# Patient Record
Sex: FEMALE | Race: WHITE | NOT HISPANIC OR LATINO | Employment: UNEMPLOYED | URBAN - METROPOLITAN AREA
[De-identification: names, ages, dates, MRNs, and addresses within clinical notes are randomized per-mention and may not be internally consistent; named-entity substitution may affect disease eponyms.]

---

## 2018-03-15 ENCOUNTER — OFFICE VISIT (OUTPATIENT)
Dept: NEUROLOGY | Facility: CLINIC | Age: 43
End: 2018-03-15

## 2018-03-15 VITALS
SYSTOLIC BLOOD PRESSURE: 108 MMHG | HEART RATE: 88 BPM | DIASTOLIC BLOOD PRESSURE: 80 MMHG | WEIGHT: 163 LBS | OXYGEN SATURATION: 99 %

## 2018-03-15 DIAGNOSIS — G56.03 BILATERAL CARPAL TUNNEL SYNDROME: ICD-10-CM

## 2018-03-15 DIAGNOSIS — M79.18 CERVICAL MYOFASCIAL PAIN SYNDROME: ICD-10-CM

## 2018-03-15 DIAGNOSIS — M54.40 CHRONIC LOW BACK PAIN WITH SCIATICA, SCIATICA LATERALITY UNSPECIFIED, UNSPECIFIED BACK PAIN LATERALITY: ICD-10-CM

## 2018-03-15 DIAGNOSIS — G89.29 CHRONIC LOW BACK PAIN WITH SCIATICA, SCIATICA LATERALITY UNSPECIFIED, UNSPECIFIED BACK PAIN LATERALITY: ICD-10-CM

## 2018-03-15 DIAGNOSIS — M79.7 FIBROMYALGIA: Primary | ICD-10-CM

## 2018-03-15 PROCEDURE — 99244 OFF/OP CNSLTJ NEW/EST MOD 40: CPT | Performed by: PSYCHIATRY & NEUROLOGY

## 2018-03-15 RX ORDER — PANTOPRAZOLE SODIUM 20 MG/1
TABLET, DELAYED RELEASE ORAL
COMMUNITY
Start: 2018-03-12

## 2018-03-15 RX ORDER — GABAPENTIN 400 MG/1
CAPSULE ORAL
COMMUNITY
Start: 2018-01-24

## 2018-03-15 RX ORDER — METHOCARBAMOL 500 MG/1
TABLET, FILM COATED ORAL
COMMUNITY
Start: 2018-03-12

## 2018-03-15 RX ORDER — MIRABEGRON 50 MG/1
TABLET, FILM COATED, EXTENDED RELEASE ORAL
COMMUNITY
Start: 2018-03-05

## 2018-03-15 RX ORDER — LEVOTHYROXINE SODIUM 75 MCG
TABLET ORAL
COMMUNITY
Start: 2018-03-12

## 2018-03-15 RX ORDER — DICYCLOMINE HYDROCHLORIDE 10 MG/1
CAPSULE ORAL
COMMUNITY
Start: 2018-03-05

## 2018-03-15 RX ORDER — SUCRALFATE 1 G/1
TABLET ORAL
COMMUNITY
Start: 2018-03-12 | End: 2018-10-02

## 2018-03-15 RX ORDER — CELECOXIB 200 MG/1
CAPSULE ORAL
COMMUNITY
Start: 2018-03-12

## 2018-03-15 RX ORDER — HYDROCODONE BITARTRATE AND ACETAMINOPHEN 7.5; 325 MG/1; MG/1
1 TABLET ORAL EVERY 6 HOURS PRN
COMMUNITY

## 2018-03-15 RX ORDER — AMITRIPTYLINE HYDROCHLORIDE 50 MG/1
TABLET, FILM COATED ORAL
COMMUNITY
Start: 2018-03-12 | End: 2018-08-21
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
## 2018-05-29 ENCOUNTER — TELEPHONE (OUTPATIENT)
Dept: NEUROLOGY | Facility: CLINIC | Age: 43
End: 2018-05-29

## 2018-05-29 ENCOUNTER — HOSPITAL ENCOUNTER (OUTPATIENT)
Dept: MRI IMAGING | Facility: HOSPITAL | Age: 43
Discharge: HOME | End: 2018-05-29
Attending: PSYCHIATRY & NEUROLOGY

## 2018-05-29 ENCOUNTER — HOSPITAL ENCOUNTER (OUTPATIENT)
Dept: NEUROLOGY | Facility: HOSPITAL | Age: 43
Discharge: HOME | End: 2018-05-29
Attending: PSYCHIATRY & NEUROLOGY | Admitting: PSYCHIATRY & NEUROLOGY

## 2018-05-29 PROCEDURE — 72148 MRI LUMBAR SPINE W/O DYE: CPT

## 2018-05-29 PROCEDURE — 95886 MUSC TEST DONE W/N TEST COMP: CPT

## 2018-05-29 PROCEDURE — 95910 NRV CNDJ TEST 7-8 STUDIES: CPT

## 2018-05-31 ENCOUNTER — TELEPHONE (OUTPATIENT)
Dept: NEUROLOGY | Facility: CLINIC | Age: 43
End: 2018-05-31

## 2018-06-05 ENCOUNTER — OFFICE VISIT (OUTPATIENT)
Dept: NEUROLOGY | Facility: CLINIC | Age: 43
End: 2018-06-05

## 2018-06-05 VITALS
HEART RATE: 84 BPM | BODY MASS INDEX: 24.99 KG/M2 | WEIGHT: 150 LBS | HEIGHT: 65 IN | DIASTOLIC BLOOD PRESSURE: 81 MMHG | SYSTOLIC BLOOD PRESSURE: 112 MMHG

## 2018-06-05 DIAGNOSIS — M79.18 CERVICAL MYOFASCIAL PAIN SYNDROME: ICD-10-CM

## 2018-06-05 DIAGNOSIS — M54.40 CHRONIC LOW BACK PAIN WITH SCIATICA, SCIATICA LATERALITY UNSPECIFIED, UNSPECIFIED BACK PAIN LATERALITY: ICD-10-CM

## 2018-06-05 DIAGNOSIS — G89.29 CHRONIC LOW BACK PAIN WITH SCIATICA, SCIATICA LATERALITY UNSPECIFIED, UNSPECIFIED BACK PAIN LATERALITY: ICD-10-CM

## 2018-06-05 DIAGNOSIS — G56.03 BILATERAL CARPAL TUNNEL SYNDROME: ICD-10-CM

## 2018-06-05 DIAGNOSIS — M79.7 FIBROMYALGIA: Primary | ICD-10-CM

## 2018-06-05 PROCEDURE — 99213 OFFICE O/P EST LOW 20 MIN: CPT | Performed by: PSYCHIATRY & NEUROLOGY

## 2018-06-05 NOTE — PROGRESS NOTES
Subjective:     Patient ID: rPiya Song is a 43 y.o. female.    CC:   Chief Complaint   Patient presents with   • Fibromyalgia       HPI:   History of Present Illness  The following portions of the patient's history were reviewed and updated as appropriate: allergies, current medications, past family history, past medical history, past social history, past surgical history and problem list.     Returns c/o persistent low back, neck, leg pain. MRI of ls spine and EMG/NCVs of legs were normal. Unfortunately she has tried and failed PT, chiropractic treatments, injections, multiple meds including gabapentin, Cymbalta, Lyrica, Elavil, other antidepressants, local measures including compounded creams, muscle relaxants.She was diagnosed with FMS by rheumatology around 2011. She has chronic anxiety and depression, on medication for that.    Past Medical History:   Diagnosis Date   • CTS (carpal tunnel syndrome)    • Depression    • Fibromyalgia, primary    • Migraine    • Sleep apnea    • Sleep apnea        Past Surgical History:   Procedure Laterality Date   • CHOLECYSTECTOMY     • ENDOMETRIAL ABLATION     • TONSILLECTOMY     • TUBAL ABDOMINAL LIGATION         Social History     Social History   • Marital status:      Spouse name: N/A   • Number of children: N/A   • Years of education: N/A     Occupational History   • Not on file.     Social History Main Topics   • Smoking status: Never Smoker   • Smokeless tobacco: Not on file   • Alcohol use Yes      Comment: once a week   • Drug use: Unknown   • Sexual activity: Not on file     Other Topics Concern   • Not on file     Social History Narrative   • No narrative on file       Family History   Problem Relation Age of Onset   • Hypertension Mother    • Malig Hypertension Mother    • Chorea Mother    • Stroke Paternal Grandmother    • Seizures Paternal Grandmother    • Heart disease Paternal Grandfather         Review of Systems   Constitutional: Positive for  fatigue. Negative for chills, fever and unexpected weight change.   HENT: Negative for ear pain, hearing loss, nosebleeds, rhinorrhea and sore throat.    Eyes: Negative for photophobia, pain, discharge, itching and visual disturbance.   Respiratory: Negative for cough, chest tightness, shortness of breath and wheezing.    Cardiovascular: Negative for chest pain, palpitations and leg swelling.   Gastrointestinal: Positive for abdominal pain, constipation and diarrhea. Negative for blood in stool, nausea and vomiting.   Endocrine: Positive for cold intolerance.   Genitourinary: Negative for dysuria, frequency, hematuria and urgency.   Musculoskeletal: Positive for arthralgias, back pain, joint swelling, myalgias, neck pain and neck stiffness. Negative for gait problem.   Skin: Negative for rash and wound.   Allergic/Immunologic: Negative for environmental allergies and food allergies.   Neurological: Positive for speech difficulty and headaches. Negative for dizziness, tremors, seizures, syncope, weakness, light-headedness and numbness.   Hematological: Negative for adenopathy. Does not bruise/bleed easily.   Psychiatric/Behavioral: Positive for confusion and dysphoric mood. Negative for agitation, decreased concentration, hallucinations, sleep disturbance and suicidal ideas. The patient is not nervous/anxious.         Objective:    Neurologic Exam     Mental Status   Oriented to person, place, and time.       Physical Exam   Constitutional: She is oriented to person, place, and time. She appears well-developed and well-nourished.   Cardiovascular: Normal rate and regular rhythm.    Pulmonary/Chest: Effort normal.   Neurological: She is alert and oriented to person, place, and time. She has normal reflexes.   Psychiatric: She has a normal mood and affect. Her behavior is normal. Thought content normal.       Assessment/Plan:       Priya was seen today for fibromyalgia.    Diagnoses and all orders for this  visit:    Fibromyalgia     -  Running out of treatment options, suspect that she needs a comprehensive pain management program.  Cervical myofascial pain syndrome     -  Pain management.  Chronic low back pain with sciatica, sciatica laterality unspecified, unspecified back pain laterality     -  Pain management  Bilateral carpal tunnel syndrome     -  Previously noted on nerve test, currently not her main concern.           Chuck Ghosh MD  6/13/2018

## 2018-06-19 ENCOUNTER — TELEPHONE (OUTPATIENT)
Dept: NEUROLOGY | Facility: CLINIC | Age: 43
End: 2018-06-19

## 2018-06-19 NOTE — TELEPHONE ENCOUNTER
----- Message from Chuck Ghosh MD sent at 6/13/2018  9:12 AM EDT -----  Regarding: referral  Referred to Dr. Renner to see what can be done for her from a comprehensive pain treatment approach, let her know that, thanks.

## 2018-08-14 ENCOUNTER — OFFICE VISIT (OUTPATIENT)
Dept: PSYCHIATRY | Facility: CLINIC | Age: 43
End: 2018-08-14

## 2018-08-14 DIAGNOSIS — F43.10 POST TRAUMATIC STRESS DISORDER (PTSD): ICD-10-CM

## 2018-08-14 DIAGNOSIS — F41.1 GENERALIZED ANXIETY DISORDER: Primary | ICD-10-CM

## 2018-08-14 DIAGNOSIS — F32.89 OTHER DEPRESSION: ICD-10-CM

## 2018-08-14 PROCEDURE — 90791 PSYCH DIAGNOSTIC EVALUATION: CPT | Performed by: SOCIAL WORKER
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
## 2018-08-21 ENCOUNTER — OFFICE VISIT (OUTPATIENT)
Dept: PSYCHIATRY | Facility: CLINIC | Age: 43
End: 2018-08-21

## 2018-08-21 VITALS — WEIGHT: 152 LBS | BODY MASS INDEX: 25.33 KG/M2 | HEIGHT: 65 IN

## 2018-08-21 DIAGNOSIS — F43.12 CHRONIC POST-TRAUMATIC STRESS DISORDER (PTSD): Primary | ICD-10-CM

## 2018-08-21 DIAGNOSIS — Z63.0 MARITAL CONFLICT: ICD-10-CM

## 2018-08-21 DIAGNOSIS — F33.1 MODERATE EPISODE OF RECURRENT MAJOR DEPRESSIVE DISORDER (HCC): ICD-10-CM

## 2018-08-21 DIAGNOSIS — G89.29 OTHER CHRONIC PAIN: ICD-10-CM

## 2018-08-21 DIAGNOSIS — F10.20 UNCOMPLICATED ALCOHOL DEPENDENCE (HCC): ICD-10-CM

## 2018-08-21 DIAGNOSIS — F51.05 INSOMNIA DUE TO MENTAL CONDITION: ICD-10-CM

## 2018-08-21 PROCEDURE — 99214 OFFICE O/P EST MOD 30 MIN: CPT | Performed by: NURSE PRACTITIONER

## 2018-08-21 RX ORDER — TRAZODONE HYDROCHLORIDE 50 MG/1
TABLET ORAL
Qty: 60 TABLET | Refills: 1 | Status: SHIPPED | OUTPATIENT
Start: 2018-08-21 | End: 2018-10-02

## 2018-08-21 RX ORDER — VENLAFAXINE HYDROCHLORIDE 75 MG/1
75 CAPSULE, EXTENDED RELEASE ORAL DAILY
Qty: 30 CAPSULE | Refills: 1 | Status: SHIPPED | OUTPATIENT
Start: 2018-08-21 | End: 2018-10-02 | Stop reason: SINTOL

## 2018-08-21 RX ORDER — BUSPIRONE HYDROCHLORIDE 5 MG/1
5 TABLET ORAL 3 TIMES DAILY
Qty: 90 TABLET | Refills: 1 | Status: SHIPPED | OUTPATIENT
Start: 2018-08-21 | End: 2018-10-02

## 2018-08-21 SDOH — SOCIAL STABILITY - SOCIAL INSECURITY: PROBLEMS IN RELATIONSHIP WITH SPOUSE OR PARTNER: Z63.0

## 2018-09-05 ENCOUNTER — OFFICE VISIT (OUTPATIENT)
Dept: PSYCHIATRY | Facility: CLINIC | Age: 43
End: 2018-09-05

## 2018-09-05 DIAGNOSIS — F43.12 CHRONIC POST-TRAUMATIC STRESS DISORDER (PTSD): Primary | ICD-10-CM

## 2018-09-05 DIAGNOSIS — F33.1 MODERATE EPISODE OF RECURRENT MAJOR DEPRESSIVE DISORDER (HCC): ICD-10-CM

## 2018-09-05 DIAGNOSIS — F10.20 UNCOMPLICATED ALCOHOL DEPENDENCE (HCC): ICD-10-CM

## 2018-09-05 DIAGNOSIS — F51.05 INSOMNIA DUE TO MENTAL CONDITION: ICD-10-CM

## 2018-09-05 PROCEDURE — 90834 PSYTX W PT 45 MINUTES: CPT | Performed by: SOCIAL WORKER

## 2018-09-05 NOTE — PROGRESS NOTES
"Date of Service: September 5, 2018  Time In: 830  Time Out: 915      PROGRESS NOTE  Data:  Priya Song is a 43 y.o. female who met with the undersigned for a regularly scheduled individual outpatient therapy session by reporting \"today is the really bad day\".  Patient discussed increased depression.  Patient discussed increased pain.  Patient discussed she had an MRI done and there is been some changes in her brain and they wanted to rule out MS.  Patient is seeing a neurologist in [place] with [facility] within the next few months to discuss the possibility of having MS.  Patient has been diagnosed with Crohn's disease and fibromyalgia.  Patient discussed she feels hopeless, worthless and helpless.  Patient discussed her trigger points for her 5 broke have elevated pain and sensitivity.  Patient discussed the Collin's disease test was negative.  Patient discussed she takes medication to wake up in medication to sleep and feels that she is on a lot of medication.  Patient asked the question what side effects and what is helping.  Patient discussed changes in life and wishing she could work and bring money into the home.  Patient discussed her and her  have been arguing and feels he does not understand her or her health conditions.     HPI: Depression, anxiety, and new health conditions      Clinical Maneuvering/Intervention:  Assisted patient in processing above session content; acknowledged and normalized patient’s thoughts, feelings, and concerns.  Assisted patient in processing her thoughts and feelings of numerous doctors appointments and a new diagnosis.  Validated patient's thoughts and feelings of symptoms of depression as increased.  Patient denies SI, HI self-harm.  Patient is agreeable to safety plan.  Patient is agreeable to taking medications as prescribed and calling to this office with questions and concerns.  Patient's goals are to be compliant with medication management, keep " all healthcare appointments.  Encouraged patient to work on getting out of the bed and exploring things that she enjoys doing.  Patient feels that she has anhedonia currently.  Educated on part work using family systems therapy for trauma.  Patient has assignment to bring 2 examples of trauma that she would like to work through that has interfered with her daily life in the past month.  Educated on changes in life and working on loss of independence.  Patient is to start journaling and bring back to th office.     Allowed patient to freely discuss issues without interruption or judgment. Provided safe, confidential environment to facilitate the development of positive therapeutic relationship and encourage open, honest communication. Assisted patient in identifying risk factors which would indicate the need for higher level of care including thoughts to harm self or others and/or self-harming behavior and encouraged patient to contact this office, call 911, or present to the nearest emergency room should any of these events occur. Discussed crisis intervention services and means to access.  Patient adamantly and convincingly denies current suicidal or homicidal ideation or perceptual disturbance.    Assessment     Diagnoses and all orders for this visit:    Chronic post-traumatic stress disorder (PTSD)    Uncomplicated alcohol dependence (CMS/HCC)    Moderate episode of recurrent major depressive disorder (CMS/HCC)    Insomnia due to mental condition               Mental Status Exam  Hygiene:  good  Dress:  casual  Attitude:  Cooperative  Motor Activity:  Appropriate  Speech:  Normal  Mood:  anxious, depressed and sad  Affect:  depressed and anxious  Thought Processes:  Goal directed  Thought Content:  normal  Suicidal Thoughts:  denies  Homicidal Thoughts:  denies  Crisis Safety Plan: yes, to come to the emergency room.  Hallucinations:  denies    Patient's Support Network Includes:  , daughter and extended  family    Progress toward goal: Not at goal    Functional Status: Moderate impairment     Prognosis: Fair with Ongoing Treatment     Plan         Patient will adhere to medication regimen as prescribed and report any side effects. Patient will contact this office, call 911 or present to the nearest emergency room should suicidal or homicidal ideations occur. Provide Cognitive Behavioral Therapy and Integrative Therapy to improve functioning, maintain stability, and avoid decompensation and the need for higher level of care.          Return in about 2 weeks (around 9/19/2018), or if symptoms worsen or fail to improve.      This document signed by Gabriella Argueta LCSW,  September 5, 2018 9:19 AM

## 2018-09-26 ENCOUNTER — OFFICE VISIT (OUTPATIENT)
Dept: PSYCHIATRY | Facility: CLINIC | Age: 43
End: 2018-09-26

## 2018-09-26 DIAGNOSIS — F51.05 INSOMNIA DUE TO MENTAL CONDITION: ICD-10-CM

## 2018-09-26 DIAGNOSIS — Z63.0 MARITAL CONFLICT: ICD-10-CM

## 2018-09-26 DIAGNOSIS — F10.20 UNCOMPLICATED ALCOHOL DEPENDENCE (HCC): ICD-10-CM

## 2018-09-26 DIAGNOSIS — F33.1 MODERATE EPISODE OF RECURRENT MAJOR DEPRESSIVE DISORDER (HCC): ICD-10-CM

## 2018-09-26 DIAGNOSIS — F41.1 GENERALIZED ANXIETY DISORDER: ICD-10-CM

## 2018-09-26 DIAGNOSIS — F43.12 CHRONIC POST-TRAUMATIC STRESS DISORDER (PTSD): Primary | ICD-10-CM

## 2018-09-26 DIAGNOSIS — G89.29 OTHER CHRONIC PAIN: ICD-10-CM

## 2018-09-26 PROCEDURE — 90834 PSYTX W PT 45 MINUTES: CPT | Performed by: SOCIAL WORKER

## 2018-09-26 SDOH — SOCIAL STABILITY - SOCIAL INSECURITY: PROBLEMS IN RELATIONSHIP WITH SPOUSE OR PARTNER: Z63.0

## 2018-09-26 NOTE — PROGRESS NOTES
"Date of Service: September 26, 2018  Time In: 1100  Time Out: 1154      PROGRESS NOTE  Data:  Priya Song is a 43 y.o. female who met with the undersigned for a regularly scheduled individual outpatient therapy session by reporting its been a rough 4-6 weeks\".  Patient discussed last Thursday she was suicidal, had a gun in her hand and called her  at work, her  called the police and the    and her brother arrived and she was able to get the gun to him.  Patient discussed she was hospitalized in the inpatient psychiatric unit at [facility].  Patient discussed she continues to have depression rating the an 8 on a 1-10 scale with 10 being the worst stating about 15% out of 100\".  Patient discussed her  has been worried about her.  Patient discussed she stop drinking 3 weeks ago.  She discussed she started isolating and staying in bed more.  Patient discussed her life stressors and listed them for this therapist:\" My health and never feeling good, no light at the end of the tunnel, daughter going to college at [employer], financial issues related to health care bills and marriage difficulties/marital distress\".  Patient discussed her medications were changed to Zoloft after she felt that her previous medication was causing agitation and irritability.  Patient discussed she wasn't hour and 15 minutes away but is committed to therapy.  Patient denies SI, HI self-harm.  Patient discussed a long history of negative thinking and depression.  Patient discussed she is processing the life she thought she would have to the life she currently has with MS, Crohn's disease and fibromyalgia.  Patient discussed not being able to work.  Patient is agreeable to safety plan and she had [facility] in the gun and put away patient knows to contact her  if suicidal ideations occur.       HPI: Depression, anxiety, and new health conditions      Clinical " Maneuvering/Intervention:  Assisted patient in processing above session content; acknowledged and normalized patient’s thoughts, feelings, and concerns.  Assisted patient in processing her thoughts and feelings suicidal ideation with intention last Thursday and being hospitalized from Thursday to Sunday.   Validated patient's thoughts and feelings of symptoms of depression has increased since last session but over the past 4-6 weeks she has noticed a change in depression..  Patient denies SI, HI self-harm.  Patient is agreeable to safety plan.  Patient is agreeable to taking medications as prescribed and calling to this office with questions and concerns.  Patient's goals are to be compliant with medication management, keep all healthcare appointments.  Encouraged patient to work on getting out of the bed and exploring things that she enjoys doing. Indicated cognitive behavior therapy and role played reframing and thought stopping.  Educated patient on needing a schedule and routine and social support.  Patient is to plan a social event twice a week i.e. going to the coffee with a friend or family member or taking a walk or going to the gym.  Increasing visits to weekly visits with this therapist and patient has an appointment with a psychiatric nurse practitioner on next Tuesday.  Educated patient on quality of life and changes and life cycle due to healthcare diagnosis and encouraging patient to find the positives each day instead of focusing on the negative.  Briefly educated on grief but will work on grief in the near future.  Patient reports stability, supports creating a schedule for herself to deter isolation and staying in bed    Allowed patient to freely discuss issues without interruption or judgment. Provided safe, confidential environment to facilitate the development of positive therapeutic relationship and encourage open, honest communication. Assisted patient in identifying risk factors which would  indicate the need for higher level of care including thoughts to harm self or others and/or self-harming behavior and encouraged patient to contact this office, call 911, or present to the nearest emergency room should any of these events occur. Discussed crisis intervention services and means to access.  Patient adamantly and convincingly denies current suicidal or homicidal ideation or perceptual disturbance.    Assessment     Diagnoses and all orders for this visit:    Chronic post-traumatic stress disorder (PTSD)    Uncomplicated alcohol dependence (CMS/HCC)    Moderate episode of recurrent major depressive disorder (CMS/HCC)    Insomnia due to mental condition    Other chronic pain    Marital conflict    Generalized anxiety disorder               Mental Status Exam  Hygiene:  good  Dress:  casual  Attitude:  Cooperative  Motor Activity:  Appropriate  Speech:  Normal  Mood:  anxious, depressed and sad  Affect:  depressed and anxious  Thought Processes:  Goal directed  Thought Content:  normal  Suicidal Thoughts:  denies  Homicidal Thoughts:  denies  Crisis Safety Plan: yes, to come to the emergency room.  Hallucinations:  denies    Patient's Support Network Includes:  , daughter and extended family    Progress toward goal: Not at goal    Functional Status: Moderate impairment     Prognosis: Fair with Ongoing Treatment     Plan         Patient will adhere to medication regimen as prescribed and report any side effects. Patient will contact this office, call 911 or present to the nearest emergency room should suicidal or homicidal ideations occur. Provide Cognitive Behavioral Therapy and Integrative Therapy to improve functioning, maintain stability, and avoid decompensation and the need for higher level of care.          Return in about 1 week (around 10/3/2018), or if symptoms worsen or fail to improve, for Patient is aware of crisis visits as needed.      This document signed by Gabriella Argueta LCSW,   September 26, 2018 12:06 PM

## 2018-10-02 ENCOUNTER — OFFICE VISIT (OUTPATIENT)
Dept: PSYCHIATRY | Facility: CLINIC | Age: 43
End: 2018-10-02

## 2018-10-02 VITALS — BODY MASS INDEX: 25.86 KG/M2 | WEIGHT: 155.2 LBS | HEIGHT: 65 IN

## 2018-10-02 DIAGNOSIS — Z63.0 MARITAL CONFLICT: ICD-10-CM

## 2018-10-02 DIAGNOSIS — F10.20 UNCOMPLICATED ALCOHOL DEPENDENCE (HCC): ICD-10-CM

## 2018-10-02 DIAGNOSIS — G89.29 OTHER CHRONIC PAIN: ICD-10-CM

## 2018-10-02 DIAGNOSIS — F43.12 CHRONIC POST-TRAUMATIC STRESS DISORDER (PTSD): Primary | ICD-10-CM

## 2018-10-02 DIAGNOSIS — F33.1 MODERATE EPISODE OF RECURRENT MAJOR DEPRESSIVE DISORDER (HCC): ICD-10-CM

## 2018-10-02 DIAGNOSIS — F51.05 INSOMNIA DUE TO MENTAL CONDITION: ICD-10-CM

## 2018-10-02 PROCEDURE — 99213 OFFICE O/P EST LOW 20 MIN: CPT | Performed by: NURSE PRACTITIONER

## 2018-10-02 SDOH — SOCIAL STABILITY - SOCIAL INSECURITY: PROBLEMS IN RELATIONSHIP WITH SPOUSE OR PARTNER: Z63.0

## 2018-10-02 NOTE — PROGRESS NOTES
"  Subjective   Priya Song is a 43 y.o. female who is here today for medication management follow up.    Chief Complaint: Diagnoses and all orders for this visit:    Chronic post-traumatic stress disorder (PTSD)    Uncomplicated alcohol dependence (CMS/HCC)    Moderate episode of recurrent major depressive disorder (CMS/HCC)    Insomnia due to mental condition    Other chronic pain    Marital conflict    History of Present Illness Patient presents by herself stating she had rage on effexor and stopped it then had suicidal thoughts and went to [place] and was admitted. She was placed on lexapro and had vomiting, she was placed on Zoloft and discharged. She went to her pcp and told her she wasn't feeling well on zoloft and was switched to Trintellix 10mg and is feeling better. She had MRI that showed possible MS, she will go to  for this. She has chronic pain in back for 4 years and is being referred once again to pain clinic she states. She is on Norco, celebrex, gabapentin, robaxin, for pain and still is in pain. She denies SI since discharge from [facility]. She rates depression 4-5 which she reports is better, anxiety is down some to 4. She is working with Gabriella Argueta LCSW every two weeks and sometimes weekly. She states she gets samples of the Trintellix from her PCP .  Denies adverse effects from medications. She reports she is sleeping well and appetite as good. Denies nightmares, denies hypervigilance.   (Scales based on 0 - 10 with 10 being the worst)        The following portions of the patient's history were reviewed and updated as appropriate: allergies, current medications, past family history, past medical history, past social history, past surgical history and problem list.    Review of Systemsdenies fever, cough, s/s’s of infection, denies GI/ problems, chronic back pain     Objective   Physical Exam  Height 165.1 cm (65\"), weight 70.4 kg (155 lb 3.2 oz).    Allergies   Allergen " Reactions   • Bactrim [Sulfamethoxazole-Trimethoprim] Irritability   • Cymbalta [Duloxetine Hcl] Mental Status Change     Suicidal thoughts    • Erythromycin Other (See Comments)     hive   • Levaquin [Levofloxacin] Hives   • Niacin Unknown (See Comments)   • Penicillins Hives       Current Medications:   Current Outpatient Prescriptions   Medication Sig Dispense Refill   • Vortioxetine HBr 10 MG tablet Take 10 mg by mouth Daily.     • celecoxib (CeleBREX) 200 MG capsule      • dicyclomine (BENTYL) 10 MG capsule      • gabapentin (NEURONTIN) 400 MG capsule      • HYDROcodone-acetaminophen (NORCO) 7.5-325 MG per tablet Take 1 tablet by mouth Every 6 (Six) Hours As Needed for Moderate Pain .     • methocarbamol (ROBAXIN) 500 MG tablet      • MYRBETRIQ 50 MG tablet sustained-release 24 hour 24 hr tablet      • pantoprazole (PROTONIX) 20 MG EC tablet      • SYNTHROID 75 MCG tablet        No current facility-administered medications for this visit.        Appearance: appropriate  Hygiene:   good  Cooperation:  Cooperative  Eye Contact:  Good  Psychomotor Behavior:  No psychomotor agitation/retardation, No EPS, No motor tics  Mood:  Depression and anxiety  Affect:  Appropriate  Hopelessness: Denies  Speech:  Normal  Thought Process:  Linear  Thought Content:  Normal  Concentration: Normal   Suicidal:  None  Homicidal:  None  Hallucinations:  None  Delusion:  None  Memory:  Intact  Orientation:  Person, Place, Time and Situation  Reliability:  fair  Insight:  Fair  Judgement:  Fair  Impulse Control:  Fair  Estimated Intelligence: average range      Assessment/Plan   Diagnoses and all orders for this visit:    Chronic post-traumatic stress disorder (PTSD)    Uncomplicated alcohol dependence (CMS/HCC)    Moderate episode of recurrent major depressive disorder (CMS/HCC)    Insomnia due to mental condition    Other chronic pain    Marital conflict        IMPRESSION: improvement on Trintellix 10mg po QD and is tolerating it.  She cont to drink alcohol, has chronic pain  PLAN:   She would like to cont with her PCP for med management, and therapy with Gabriella MENESESW  Will see pt as needed basis     We discussed risks, benefits, and side effects of the above medications and the patient was agreeable with the plan. Patient was educated on the importance of compliance with treatment and follow-up appointments.     Sleep hygiene reviewed,    Coping skills reviewed and encouraged positive framing of thoughts    Assisted patient in processing above session content; acknowledged and normalized patient’s thoughts, feelings, and concerns.  Applied  positive coping skills and behavior management in session.  Allowed patient to freely discuss issues without interruption or judgment. Provided safe, confidential environment to facilitate the development of positive therapeutic relationship and encourage open, honest communication. Assisted patient in identifying risk factors which would indicate the need for higher level of care including thoughts to harm self or others and/or self-harming behavior and encouraged patient to contact this office, call 911, or present to the nearest emergency room should any of these events occur. Discussed crisis intervention services and means to access.  Patient adamantly and convincingly denies current suicidal or homicidal ideation or perceptual disturbance.    Treatment Plan: stabilize mood, patient will stay out of the hospital and be at optimal level of functioning, take all medication as prescribed. Patient verbalized  understanding and agreement to plan.    Instructed to call for questions or concerns and return early if necessary. Crisis plan reviewed including going to the Emergency department.    Return if symptoms worsen or fail to improve.

## 2018-10-09 ENCOUNTER — OFFICE VISIT (OUTPATIENT)
Dept: NEUROLOGY | Facility: CLINIC | Age: 43
End: 2018-10-09

## 2018-10-09 VITALS
HEART RATE: 74 BPM | SYSTOLIC BLOOD PRESSURE: 120 MMHG | DIASTOLIC BLOOD PRESSURE: 78 MMHG | WEIGHT: 155 LBS | BODY MASS INDEX: 25.83 KG/M2 | HEIGHT: 65 IN

## 2018-10-09 DIAGNOSIS — F32.A ANXIETY AND DEPRESSION: ICD-10-CM

## 2018-10-09 DIAGNOSIS — M79.7 FIBROMYALGIA: Primary | ICD-10-CM

## 2018-10-09 DIAGNOSIS — F41.9 ANXIETY AND DEPRESSION: ICD-10-CM

## 2018-10-09 PROCEDURE — 99213 OFFICE O/P EST LOW 20 MIN: CPT | Performed by: PSYCHIATRY & NEUROLOGY

## 2018-10-17 ENCOUNTER — TELEPHONE (OUTPATIENT)
Dept: PAIN MEDICINE | Facility: CLINIC | Age: 43
End: 2018-10-17

## 2018-10-17 NOTE — TELEPHONE ENCOUNTER
Spoke with Dr. Hensley's office, patient has been following up there had appointment 10/03 and today.   Patient was not discharged from Dr. Oj Whipple's office- they report that they referred her to Neurology in [place] and she has not had an appointment since 12/20/2017.   Patient was not discharged from Dr. Torrez's office- last appointment was 2017  Let patient know of recommendations from Dr. Hensley.   -  It is recommended patient be seen for psychological treatment of her pain condition and sleep.  She will return to begin treatment and 1-2 weeks.   -  It is recommended patient continue to receive outside psychiatric treatment and counseling for her depression and PTSD.   - From a psychological perspective, patient has not considered to be an appropriate candidate for spinal cord stimulator at this time as she has significant depression and anxiety which are exacerbating her pain and we'll make it difficult to determine an accurate trial.   - Once patient demonstrates a 50% reduction in depression and anxiety and no more suicidal ideation, I will reconsider her for spinal cord stimulator from a psychological perspective at that time.     Patient verbalizes understanding and will call when she has achieved that goal.     Patient has never had back surgery.

## 2018-10-24 ENCOUNTER — OFFICE VISIT (OUTPATIENT)
Dept: PSYCHIATRY | Facility: CLINIC | Age: 43
End: 2018-10-24

## 2018-10-24 DIAGNOSIS — F10.20 UNCOMPLICATED ALCOHOL DEPENDENCE (HCC): ICD-10-CM

## 2018-10-24 DIAGNOSIS — G89.29 OTHER CHRONIC PAIN: ICD-10-CM

## 2018-10-24 DIAGNOSIS — F43.12 CHRONIC POST-TRAUMATIC STRESS DISORDER (PTSD): Primary | ICD-10-CM

## 2018-10-24 DIAGNOSIS — F51.05 INSOMNIA DUE TO MENTAL CONDITION: ICD-10-CM

## 2018-10-24 DIAGNOSIS — F33.1 MODERATE EPISODE OF RECURRENT MAJOR DEPRESSIVE DISORDER (HCC): ICD-10-CM

## 2018-10-24 PROCEDURE — 90834 PSYTX W PT 45 MINUTES: CPT | Performed by: SOCIAL WORKER

## 2018-10-24 NOTE — PROGRESS NOTES
Date of Service: October 24, 2018  Time In: 920  Time Out: 1020      PROGRESS NOTE  Data:  Priya Song is a 43 y.o. female who met with the undersigned for a regularly scheduled individual outpatient therapy session by reporting she has been sick with a stomach bug and now cold.  Patient discussed having anxiety was waking her up in the middle the night.  Patient states that she saw the psychiatrist that is affiliated with the hospital where she went inpatient.  Patient discussed she is anticipating a neurology appointment on Monday to find out if she has MS.  Patient discussed her rheumatologist believes it is MS.  Patient's  is going to go with her.  Patient discussed she is trying to work on exercising by joining a gym that's about 10 minutes away from her house.  She discussed not having much to do oral area and lives about an hour and a half from this office.  Patient discussed again trying to wake up earlier and not staying in bed all day.  Patient is rather plain things on the weekends to do with her .  Patient discussed one argument with her  since our last session.  Patient apologizes for canceling her last appointment due to a stomach bug.  Patient discussed she loves reading books. Patient reports drinking 3 beers on one day since our last session and realizing she uses it to cope and doesn't like the feeling of having to use something to numb herself so she is working on abstinence     HPI: Depression, anxiety, and new health conditions, alcohol abuse      Clinical Maneuvering/Intervention:  Assisted patient in processing above session content; acknowledged and normalized patient’s thoughts, feelings, and concerns.  Assisted patient in processing her thoughts and feelings depression, anxiety, chronic pain and chronic health conditions.  Validated patient's thoughts and feelings of symptoms of depression has improved some since last action Patient denies SI, HI self-harm.  Patient  is agreeable to safety plan.  Patient is agreeable to taking medications as prescribed and calling to this office with questions and concerns.  Patient's goals are to be compliant with medication management, keep all healthcare appointments.  Encouraged patient to work on getting out of the bed and exploring things that she enjoys doing. Educated patient on needing a schedule and routine and social support.  Patient is to plan a social event twice a week i.e. going to the coffee with a friend or family member or taking a walk or going to the gym.  Increasing visits to weekly visits with this therapist continue medication management appointments patient denies SI, HI self-harm.  Patient is aware of crisis visits as needed.  Educated patient on cognitive behavioral therapy, cognitive distortions and automatic thoughts/thinking/thinking errors.  Provided handout on each of these the patient is to start working on reframing thoughts using cognitive behavioral therapy and working on awareness of her cognitive distortions    Allowed patient to freely discuss issues without interruption or judgment. Provided safe, confidential environment to facilitate the development of positive therapeutic relationship and encourage open, honest communication. Assisted patient in identifying risk factors which would indicate the need for higher level of care including thoughts to harm self or others and/or self-harming behavior and encouraged patient to contact this office, call 911, or present to the nearest emergency room should any of these events occur. Discussed crisis intervention services and means to access.  Patient adamantly and convincingly denies current suicidal or homicidal ideation or perceptual disturbance.    Assessment     Diagnoses and all orders for this visit:    Chronic post-traumatic stress disorder (PTSD)    Uncomplicated alcohol dependence (CMS/HCC)    Moderate episode of recurrent major depressive disorder  (CMS/[facility])    Insomnia due to mental condition    Other chronic pain               Mental Status Exam  Hygiene:  good  Dress:  casual  Attitude:  Cooperative  Motor Activity:  Appropriate  Speech:  Normal  Mood:  anxious, depressed and sad  Affect:  depressed and anxious  Thought Processes:  Goal directed  Thought Content:  normal  Suicidal Thoughts:  denies  Homicidal Thoughts:  denies  Crisis Safety Plan: yes, to come to the emergency room.  Hallucinations:  denies    Patient's Support Network Includes:  , daughter and extended family    Progress toward goal: Not at goal    Functional Status: Moderate impairment     Prognosis: Fair with Ongoing Treatment     Plan         Patient will adhere to medication regimen as prescribed and report any side effects. Patient will contact this office, call 911 or present to the nearest emergency room should suicidal or homicidal ideations occur. Provide Cognitive Behavioral Therapy and Integrative Therapy to improve functioning, maintain stability, and avoid decompensation and the need for higher level of care.          Return in about 1 week (around 10/31/2018), or if symptoms worsen or fail to improve.      This document signed by Gabriella Argueta LCSW,  October 24, 2018 10:51 AM

## 2018-11-07 ENCOUNTER — OFFICE VISIT (OUTPATIENT)
Dept: PSYCHIATRY | Facility: CLINIC | Age: 43
End: 2018-11-07

## 2018-11-07 DIAGNOSIS — F51.05 INSOMNIA DUE TO MENTAL CONDITION: ICD-10-CM

## 2018-11-07 DIAGNOSIS — F43.12 CHRONIC POST-TRAUMATIC STRESS DISORDER (PTSD): Primary | ICD-10-CM

## 2018-11-07 DIAGNOSIS — G89.29 OTHER CHRONIC PAIN: ICD-10-CM

## 2018-11-07 DIAGNOSIS — F33.1 MODERATE EPISODE OF RECURRENT MAJOR DEPRESSIVE DISORDER (HCC): ICD-10-CM

## 2018-11-07 DIAGNOSIS — F10.20 UNCOMPLICATED ALCOHOL DEPENDENCE (HCC): ICD-10-CM

## 2018-11-07 PROCEDURE — 90834 PSYTX W PT 45 MINUTES: CPT | Performed by: SOCIAL WORKER

## 2018-11-07 NOTE — PROGRESS NOTES
"Date of Service: November 7, 2018  Time In: 1100  Time Out: 1200      PROGRESS NOTE  Data:  Priya Song is a 43 y.o. female who met with the undersigned for a regularly scheduled individual outpatient therapy session by reporting she is seeing a psychologist for a sleep disorder doctor in [place]. patient discussed the MRI results was not clear indemnifier for MS so she is having a spinal fluid test Friday. Patient discussed being nervous about the test and states \"the neurologist thinks its not MS\". patient discussed she feels like a  Zombie. patient discussed seeing many medical providers to find out what is going on and mentioned that she feels like giving up on trying as she has had many medical test without any new answers. patient discussed facial pain most days. Patient discussed the combination of pain, anxiety and depression is difficult to live with/ patient discussed waking up with panic attacks. Patient continues to see a psychiatrist that she saw in the inpatient hospitalization.     HPI: Depression, anxiety, and new health conditions, alcohol abuse      Clinical Maneuvering/Intervention:  Assisted patient in processing above session content; acknowledged and normalized patient’s thoughts, feelings, and concerns.  Assisted patient in processing her thoughts and feelings depression, anxiety, chronic pain and chronic health conditions.  Validated patient's thoughts and feelings of symptoms of depression has improved some since last action Patient denies SI, HI self-harm.  Patient is agreeable to safety plan.  Patient is agreeable to taking medications as prescribed and calling the presecriber with questions and concerns.  Patient's goals are to be compliant with medication management, keep all healthcare appointments and focusing on her self care.  Encouraged patient to work on getting out of the bed and exploring things that she enjoys doing. Educated patient on needing a schedule and routine and " social support.  Patient is to plan a social event twice a week i.e. going to the coffee with a friend or family member or taking a walk or going to the gym.  Increasing visits to weekly visits with this therapist continue medication management appointments patient denies SI, HI self-harm.  Patient is aware of crisis visits as needed.  Educated patient on cognitive behavioral therapy, cognitive distortions and automatic thoughts/thinking/thinking errors.  Reviewed handouts and encouraged patient to work on being her self advocate.     Allowed patient to freely discuss issues without interruption or judgment. Provided safe, confidential environment to facilitate the development of positive therapeutic relationship and encourage open, honest communication. Assisted patient in identifying risk factors which would indicate the need for higher level of care including thoughts to harm self or others and/or self-harming behavior and encouraged patient to contact this office, call 911, or present to the nearest emergency room should any of these events occur. Discussed crisis intervention services and means to access.  Patient adamantly and convincingly denies current suicidal or homicidal ideation or perceptual disturbance.    Assessment     Diagnoses and all orders for this visit:    Chronic post-traumatic stress disorder (PTSD)    Uncomplicated alcohol dependence (CMS/HCC)    Moderate episode of recurrent major depressive disorder (CMS/HCC)    Insomnia due to mental condition    Other chronic pain               Mental Status Exam  Hygiene:  good  Dress:  casual  Attitude:  Cooperative  Motor Activity:  Appropriate  Speech:  Normal  Mood:  anxious, depressed and sad  Affect:  depressed and anxious  Thought Processes:  Goal directed  Thought Content:  normal  Suicidal Thoughts:  denies  Homicidal Thoughts:  denies  Crisis Safety Plan: yes, to come to the emergency room.  Hallucinations:  denies    Patient's Support Network  Includes:  , daughter and extended family    Progress toward goal: Not at goal    Functional Status: Moderate impairment     Prognosis: Fair with Ongoing Treatment     Plan         Patient will adhere to medication regimen as prescribed and report any side effects. Patient will contact this office, call 911 or present to the nearest emergency room should suicidal or homicidal ideations occur. Provide Cognitive Behavioral Therapy and Integrative Therapy to improve functioning, maintain stability, and avoid decompensation and the need for higher level of care.          Return in about 1 week (around 11/14/2018), or if symptoms worsen or fail to improve.      This document signed by Gabriella Argueta LCSW,  November 7, 2018 1:07 PM

## 2018-11-09 ENCOUNTER — LAB REQUISITION (OUTPATIENT)
Dept: LAB | Facility: HOSPITAL | Age: 43
End: 2018-11-09

## 2018-11-09 DIAGNOSIS — Z00.00 ROUTINE GENERAL MEDICAL EXAMINATION AT A HEALTH CARE FACILITY: ICD-10-CM

## 2018-11-09 LAB
APPEARANCE CSF: CLEAR
COLOR CSF: COLORLESS
COLOR SPUN CSF: COLORLESS
GLUCOSE CSF-MCNC: 56 MG/DL (ref 40–70)
PROT CSF-MCNC: 29 MG/DL (ref 15–45)
RBC # CSF MANUAL: 8 /MM3 (ref 0–5)
SPECIMEN VOL CSF: 2 ML
TUBE # CSF: 1
WBC # CSF MANUAL: 0 /MM3 (ref 0–5)

## 2018-11-09 PROCEDURE — 84157 ASSAY OF PROTEIN OTHER: CPT

## 2018-11-09 PROCEDURE — 82945 GLUCOSE OTHER FLUID: CPT

## 2018-11-09 PROCEDURE — 89050 BODY FLUID CELL COUNT: CPT

## 2018-11-14 ENCOUNTER — OFFICE VISIT (OUTPATIENT)
Dept: PSYCHIATRY | Facility: CLINIC | Age: 43
End: 2018-11-14

## 2018-11-14 DIAGNOSIS — G89.29 OTHER CHRONIC PAIN: ICD-10-CM

## 2018-11-14 DIAGNOSIS — F33.1 MODERATE EPISODE OF RECURRENT MAJOR DEPRESSIVE DISORDER (HCC): ICD-10-CM

## 2018-11-14 DIAGNOSIS — F43.12 CHRONIC POST-TRAUMATIC STRESS DISORDER (PTSD): Primary | ICD-10-CM

## 2018-11-14 DIAGNOSIS — F51.05 INSOMNIA DUE TO MENTAL CONDITION: ICD-10-CM

## 2018-11-14 DIAGNOSIS — F10.20 UNCOMPLICATED ALCOHOL DEPENDENCE (HCC): ICD-10-CM

## 2018-11-14 PROCEDURE — 90834 PSYTX W PT 45 MINUTES: CPT | Performed by: SOCIAL WORKER

## 2018-11-14 NOTE — PROGRESS NOTES
"Date of Service: November 14, 2018  Time In: 1050  Time Out: 1145      PROGRESS NOTE  Data:  Priya Song is a 43 y.o. female who met with the undersigned for a regularly scheduled individual outpatient therapy session by reporting \"i had my spinal on Friday\". Patient discussed the results are not in but what the doctor can tell so far she doesn't have MS. Patient reports \"its bittersweet because I want answers\". patient discussed her daughter has OCD, anxiety and panic attacks. Patient discussed she is worried about her daughter because she posted something on [employer] that she wishes she could get her happy back. Patient discussed her daughter won't talk to her but she can tell something is going on. Patient asked is she could make her daughter an appt with this office to be assessed. Patient discussed she is feeling overwhelmed and her  is worried about her going backwards in her depression. Patient discussed after the first of the year she will attempt [facility] or [place] for a second option. Patient denies si, hi and self harm. Patient explained she is trying to fight against her depressed mood by getting out of the house and states \"I would stay at home and in bed if not\". Patient discussed she is aware of the increase in depression. pateint denies alcohol abuse or use.     HPI: Depression, anxiety, and new health conditions, alcohol abuse      Clinical Maneuvering/Intervention:  Assisted patient in processing above session content; acknowledged and normalized patient’s thoughts, feelings, and concerns.  Assisted patient in processing her thoughts and feelings depression, anxiety, chronic pain and chronic health conditions.  Validated patient's thoughts and feelings of symptoms of depression has improved some since last session.  Patient denies SI, HI self-harm.  Patient is agreeable to safety plan.  Patient is agreeable to taking medications as prescribed and calling the presecriber with " questions and concerns.  Patient's goals are to be compliant with medication management, keep all healthcare appointments and focusing on her self care.  Encouraged patient to work on getting out of the bed and exploring things that she enjoys doing. Educated patient on needing a schedule and routine and social support.   Patient is aware of crisis visits as needed. Continue to educate patient on patient on reframing negative thinking and healthy coping skills to work on depressed mood and feeling overwhelmed. Patient is to get out of the house this weekend with her  for a change of scenery and go see their daughter. Encouraged gratitude exercise. Educated on family systems theory part work and patient is to stat doing this in reference to past trauma related to her family.       Allowed patient to freely discuss issues without interruption or judgment. Provided safe, confidential environment to facilitate the development of positive therapeutic relationship and encourage open, honest communication. Assisted patient in identifying risk factors which would indicate the need for higher level of care including thoughts to harm self or others and/or self-harming behavior and encouraged patient to contact this office, call 911, or present to the nearest emergency room should any of these events occur. Discussed crisis intervention services and means to access.  Patient adamantly and convincingly denies current suicidal or homicidal ideation or perceptual disturbance.    Assessment     Diagnoses and all orders for this visit:    Chronic post-traumatic stress disorder (PTSD)    Uncomplicated alcohol dependence (CMS/HCC)    Moderate episode of recurrent major depressive disorder (CMS/HCC)    Insomnia due to mental condition    Other chronic pain               Mental Status Exam  Hygiene:  good  Dress:  casual  Attitude:  Cooperative  Motor Activity:  Appropriate  Speech:  Normal  Mood:  anxious, depressed and  sad  Affect:  depressed and anxious  Thought Processes:  Goal directed  Thought Content:  normal  Suicidal Thoughts:  denies  Homicidal Thoughts:  denies  Crisis Safety Plan: yes, to come to the emergency room.  Hallucinations:  denies    Patient's Support Network Includes:  , daughter and extended family    Progress toward goal: Not at goal    Functional Status: Moderate impairment     Prognosis: Fair with Ongoing Treatment     Plan         Patient will adhere to medication regimen as prescribed and report any side effects. Patient will contact this office, call 911 or present to the nearest emergency room should suicidal or homicidal ideations occur. Provide Cognitive Behavioral Therapy and Integrative Therapy to improve functioning, maintain stability, and avoid decompensation and the need for higher level of care.          Return in about 1 week (around 11/21/2018), or if symptoms worsen or fail to improve.      This document signed by Gabriella Argueta LCSW,  November 14, 2018 2:16 PM

## 2018-11-20 ENCOUNTER — OFFICE VISIT (OUTPATIENT)
Dept: PSYCHIATRY | Facility: CLINIC | Age: 43
End: 2018-11-20

## 2018-11-20 DIAGNOSIS — F10.20 UNCOMPLICATED ALCOHOL DEPENDENCE (HCC): ICD-10-CM

## 2018-11-20 DIAGNOSIS — F43.12 CHRONIC POST-TRAUMATIC STRESS DISORDER (PTSD): Primary | ICD-10-CM

## 2018-11-20 DIAGNOSIS — F33.1 MODERATE EPISODE OF RECURRENT MAJOR DEPRESSIVE DISORDER (HCC): ICD-10-CM

## 2018-11-20 DIAGNOSIS — G89.29 OTHER CHRONIC PAIN: ICD-10-CM

## 2018-11-20 DIAGNOSIS — F51.05 INSOMNIA DUE TO MENTAL CONDITION: ICD-10-CM

## 2018-11-20 PROCEDURE — 90834 PSYTX W PT 45 MINUTES: CPT | Performed by: SOCIAL WORKER
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
## 2018-12-03 ENCOUNTER — OFFICE VISIT (OUTPATIENT)
Dept: PSYCHIATRY | Facility: CLINIC | Age: 43
End: 2018-12-03

## 2018-12-03 DIAGNOSIS — G89.29 OTHER CHRONIC PAIN: ICD-10-CM

## 2018-12-03 DIAGNOSIS — F10.20 UNCOMPLICATED ALCOHOL DEPENDENCE (HCC): ICD-10-CM

## 2018-12-03 DIAGNOSIS — F43.12 CHRONIC POST-TRAUMATIC STRESS DISORDER (PTSD): Primary | ICD-10-CM

## 2018-12-03 DIAGNOSIS — F33.1 MODERATE EPISODE OF RECURRENT MAJOR DEPRESSIVE DISORDER (HCC): ICD-10-CM

## 2018-12-03 DIAGNOSIS — F51.05 INSOMNIA DUE TO MENTAL CONDITION: ICD-10-CM

## 2018-12-03 PROCEDURE — 90834 PSYTX W PT 45 MINUTES: CPT | Performed by: SOCIAL WORKER

## 2018-12-04 NOTE — PROGRESS NOTES
"Date of Service: December 4, 2018  Time In: 200  Time Out: 250      PROGRESS NOTE  Data:  Priya Song is a 43 y.o. female who met with the undersigned for a regularly scheduled individual outpatient therapy session by reporting \"the neurologist said I did not have MS\".  Patient discussed after the first of the year she wants to go to the [facility] for second opinion.  Patient discussed she relapsed last week and missed her appointment.  Patient discussed she missed her appointment due to illness.  Patient discussed she was not taking her medications due to being sick from alcohol years and being off the medications also made her sick.  Patient describes sickness stomach upset, nausea and fatigue.  Patient discussed that she will be following up with her rheumatologist.Patient discussed feeling discouraged and overwhelmed by no answers after numerous appointments with different specialties.  Patient discussed she cannot get pain management until she sees a psychologist at the pain management clinic for a while due to her mental health diagnosis.  Patient discussed she has a history of being addicted to pain pills and was able to quit pain pills on her own.  Patient discussed she realizes that is her drug of choice and would be easy to get addicted to them so she does not take opiates.  Patient discussed she uses alcohol to cope and has relapsed on 2 occasions since our last session 2 weeks ago.    HPI: Depression, anxiety, and new health conditions, alcohol abuse      Clinical Maneuvering/Intervention:  Assisted patient in processing above session content; acknowledged and normalized patient’s thoughts, feelings, and concerns.  Assisted patient in processing her thoughts and feelings depression, anxiety, chronic pain and chronic health conditions.  Validated patient's thoughts and feelings of symptoms of depression has increased since last session.  Patient denies SI, HI self-harm.  Patient is agreeable to " safety plan.  Patient is agreeable to taking medications as prescribed and calling the prescribe with questions and concerns.  Patient's goals are to be compliant with medication management, keep all healthcare appointments and focusing on her self care.    Patient is aware of crisis visits as needed. Continue to educate patient on patient on reframing negative thinking and healthy coping skills to work on depressed mood and feeling overwhelmed.  Educated on programs for addiction such as AA and IOP.  Encouraged patient to spend time over the next week researching both and seeing what is near, she drives over an hour to come to this appointment and educated on the importance of having a program to help her with her alcoholism.  Patient has been resistant to this in the past but after the relapse she is becoming more open to hearing about resources in her area.  Encouraged patient to try an AA meeting and to talk to her medication management provider at [facility] about their IOP program and also educated patient on the IOP program and this office so she has options.  Educated on the importance of working a 12-step program, having a sponsor and social support system and understands what she is going through.  Asked patient to talk to her  about refraining from alcohol and has since patient has had recent relapses.  Patient is agreeable to doing so.  Encourage patient to have a plan while her daughter is here to start working a program are planning on a program for when her daughter leaves and goes back to school as she mentioned in the session that her daughter will be a coping skill for her but her daughter will go back to college and encourage the importance of already having a plan/program in place.  Patient is to contact this office or questions and concerns.  Patient is a  in the crisis visits as needed.  Patient is to the emergency room for any SI, HI or self-harm.    Allowed  patient to freely discuss issues without interruption or judgment. Provided safe, confidential environment to facilitate the development of positive therapeutic relationship and encourage open, honest communication. Assisted patient in identifying risk factors which would indicate the need for higher level of care including thoughts to harm self or others and/or self-harming behavior and encouraged patient to contact this office, call 911, or present to the nearest emergency room should any of these events occur. Discussed crisis intervention services and means to access.  Patient adamantly and convincingly denies current suicidal or homicidal ideation or perceptual disturbance.    Assessment     Diagnoses and all orders for this visit:    Chronic post-traumatic stress disorder (PTSD)    Moderate episode of recurrent major depressive disorder (CMS/HCC)    Uncomplicated alcohol dependence (CMS/HCC)    Insomnia due to mental condition    Other chronic pain               Mental Status Exam  Hygiene:  good  Dress:  casual  Attitude:  Cooperative  Motor Activity:  Appropriate  Speech:  Normal  Mood:  anxious, depressed and sad  Affect:  depressed and anxious  Thought Processes:  Goal directed  Thought Content:  normal  Suicidal Thoughts:  denies  Homicidal Thoughts:  denies  Crisis Safety Plan: yes, to come to the emergency room.  Hallucinations:  denies    Patient's Support Network Includes:  , daughter and extended family    Progress toward goal: Not at goal    Functional Status: Moderate impairment     Prognosis: Fair with Ongoing Treatment     Plan         Patient will adhere to medication regimen as prescribed and report any side effects. Patient will contact this office, call 911 or present to the nearest emergency room should suicidal or homicidal ideations occur. Provide Cognitive Behavioral Therapy and Integrative Therapy to improve functioning, maintain stability, and avoid decompensation and the need  for higher level of care.          Return in about 1 week (around 12/10/2018), or if symptoms worsen or fail to improve.      This document signed by Gabriella Argueta LCSW,  December 4, 2018 3:39 PM

## 2019-01-07 ENCOUNTER — OFFICE VISIT (OUTPATIENT)
Dept: PSYCHIATRY | Facility: CLINIC | Age: 44
End: 2019-01-07

## 2019-01-07 DIAGNOSIS — F33.1 MODERATE EPISODE OF RECURRENT MAJOR DEPRESSIVE DISORDER (HCC): ICD-10-CM

## 2019-01-07 DIAGNOSIS — F51.05 INSOMNIA DUE TO MENTAL CONDITION: ICD-10-CM

## 2019-01-07 DIAGNOSIS — F43.12 CHRONIC POST-TRAUMATIC STRESS DISORDER (PTSD): Primary | ICD-10-CM

## 2019-01-07 DIAGNOSIS — G89.29 OTHER CHRONIC PAIN: ICD-10-CM

## 2019-01-07 DIAGNOSIS — F10.20 UNCOMPLICATED ALCOHOL DEPENDENCE (HCC): ICD-10-CM

## 2019-01-07 PROCEDURE — 90834 PSYTX W PT 45 MINUTES: CPT | Performed by: SOCIAL WORKER

## 2019-01-07 NOTE — PROGRESS NOTES
"Date of Service: January 7, 2019  Time In: 1200  Time Out: 1250      PROGRESS NOTE  Data:  Priya Song is a 43 y.o. female who met with the undersigned for a regularly scheduled individual outpatient therapy session by reporting \"depression is a little better but anxiety is worse\".  Patient discussed the medication she is taking for anxiety as stopped working and has an appointment with her medication management provider at [facility].  Patient discussed sleep is an issue and having difficult time coping with sleep loss.  Patient passed her body hurts all over in her mind races.  Patient denies SI, HI self-harm.  Patient reviewed sleep hygiene she has tried and was not working per patient.  Patient discussed she is \"her care on hold to help take care of her daughter and get her daughter back into school.  Patient is asked she is willing to try new treatment modalities and feeling like she is running out of options.  Patient denies alcohol use since her daughter's been home from college.    HPI: Depression, anxiety, and new health conditions, alcohol abuse      Clinical Maneuvering/Intervention:  Assisted patient in processing above session content; acknowledged and normalized patient’s thoughts, feelings, and concerns.  Assisted patient in processing her thoughts and feelings depression, anxiety, chronic pain and chronic health conditions.  Validated patient's thoughts and feelings of symptoms of depression and anxiety.  Assisted patient in processing her feelings of depression improving and anxiety worsening/increasing.  Patient denies SI, HI self-harm.  Patient is agreeable to safety plan.  Patient is agreeable to taking medications as prescribed and calling the prescribe with questions and concerns.  Patient's goals are to be compliant with medication management, keep all healthcare appointments and focusing on her self care.    Patient is aware of crisis visits as needed. Continue to educate " patient on patient on reframing negative thinking and healthy coping skills to work on depressed mood and feeling overwhelmed.  Patient has been resistant to this in the past but after the relapse she is becoming more open to hearing about resources in her area.   Encourage patient to have a plan while her daughter is here to start working a program are planning on a program for when her daughter leaves and goes back to school as she mentioned in the session that her daughter will be a coping skill for her but her daughter will go back to college and encourage the importance of already having a plan/program in place.  Patient is to contact this office or questions and concerns.  Me new goals patient is going to try going to the gym 3 times a week and restart going to the chiropractor.  Encourage patient to start yoga or yoga routine and patient explained that her doctor and chiropractor has mentioned yoga in the past and she will look into.  Encouraged patient to look at it on YouTube or go to yoga studio if 's permission to do so.  Encourage patient to work on self-awareness, self-acceptance/mindfulness techniques    Allowed patient to freely discuss issues without interruption or judgment. Provided safe, confidential environment to facilitate the development of positive therapeutic relationship and encourage open, honest communication. Assisted patient in identifying risk factors which would indicate the need for higher level of care including thoughts to harm self or others and/or self-harming behavior and encouraged patient to contact this office, call 911, or present to the nearest emergency room should any of these events occur. Discussed crisis intervention services and means to access.  Patient adamantly and convincingly denies current suicidal or homicidal ideation or perceptual disturbance.    Assessment     Diagnoses and all orders for this visit:    Chronic post-traumatic stress disorder  (PTSD)    Moderate episode of recurrent major depressive disorder (CMS/HCC)    Uncomplicated alcohol dependence (CMS/HCC)    Insomnia due to mental condition    Other chronic pain               Mental Status Exam  Hygiene:  good  Dress:  casual  Attitude:  Cooperative  Motor Activity:  Appropriate  Speech:  Normal  Mood:  anxious, depressed and sad  Affect:  depressed and anxious  Thought Processes:  Goal directed  Thought Content:  normal  Suicidal Thoughts:  denies  Homicidal Thoughts:  denies  Crisis Safety Plan: yes, to come to the emergency room.  Hallucinations:  denies    Patient's Support Network Includes:  , daughter and extended family    Progress toward goal: Not at goal    Functional Status: Moderate impairment     Prognosis: Fair with Ongoing Treatment     Plan         Patient will adhere to medication regimen as prescribed and report any side effects. Patient will contact this office, call 911 or present to the nearest emergency room should suicidal or homicidal ideations occur. Provide Cognitive Behavioral Therapy and Integrative Therapy to improve functioning, maintain stability, and avoid decompensation and the need for higher level of care.          Return in about 1 week (around 1/14/2019), or if symptoms worsen or fail to improve.      This document signed by Gabriella Argueta LCSW,  January 7, 2019 1:01 PM

## 2019-01-14 ENCOUNTER — OFFICE VISIT (OUTPATIENT)
Dept: PSYCHIATRY | Facility: CLINIC | Age: 44
End: 2019-01-14

## 2019-01-14 DIAGNOSIS — G89.29 OTHER CHRONIC PAIN: ICD-10-CM

## 2019-01-14 DIAGNOSIS — F10.20 UNCOMPLICATED ALCOHOL DEPENDENCE (HCC): ICD-10-CM

## 2019-01-14 DIAGNOSIS — F33.1 MODERATE EPISODE OF RECURRENT MAJOR DEPRESSIVE DISORDER (HCC): ICD-10-CM

## 2019-01-14 DIAGNOSIS — F51.05 INSOMNIA DUE TO MENTAL CONDITION: ICD-10-CM

## 2019-01-14 DIAGNOSIS — F43.12 CHRONIC POST-TRAUMATIC STRESS DISORDER (PTSD): Primary | ICD-10-CM

## 2019-01-14 DIAGNOSIS — F41.1 GENERALIZED ANXIETY DISORDER: ICD-10-CM

## 2019-01-14 PROCEDURE — 90837 PSYTX W PT 60 MINUTES: CPT | Performed by: SOCIAL WORKER
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
## 2019-01-23 ENCOUNTER — OFFICE VISIT (OUTPATIENT)
Dept: PSYCHIATRY | Facility: CLINIC | Age: 44
End: 2019-01-23

## 2019-01-23 DIAGNOSIS — F33.1 MODERATE EPISODE OF RECURRENT MAJOR DEPRESSIVE DISORDER (HCC): ICD-10-CM

## 2019-01-23 DIAGNOSIS — G89.29 OTHER CHRONIC PAIN: ICD-10-CM

## 2019-01-23 DIAGNOSIS — F51.05 INSOMNIA DUE TO MENTAL CONDITION: ICD-10-CM

## 2019-01-23 DIAGNOSIS — F43.12 CHRONIC POST-TRAUMATIC STRESS DISORDER (PTSD): Primary | ICD-10-CM

## 2019-01-23 DIAGNOSIS — F10.20 UNCOMPLICATED ALCOHOL DEPENDENCE (HCC): ICD-10-CM

## 2019-01-23 DIAGNOSIS — F41.1 GENERALIZED ANXIETY DISORDER: ICD-10-CM

## 2019-01-23 PROCEDURE — 90834 PSYTX W PT 45 MINUTES: CPT | Performed by: SOCIAL WORKER
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
## 2019-01-28 ENCOUNTER — OFFICE VISIT (OUTPATIENT)
Dept: PSYCHIATRY | Facility: CLINIC | Age: 44
End: 2019-01-28

## 2019-01-28 DIAGNOSIS — G89.29 OTHER CHRONIC PAIN: ICD-10-CM

## 2019-01-28 DIAGNOSIS — F51.05 INSOMNIA DUE TO MENTAL CONDITION: ICD-10-CM

## 2019-01-28 DIAGNOSIS — F43.12 CHRONIC POST-TRAUMATIC STRESS DISORDER (PTSD): Primary | ICD-10-CM

## 2019-01-28 DIAGNOSIS — F33.1 MODERATE EPISODE OF RECURRENT MAJOR DEPRESSIVE DISORDER (HCC): ICD-10-CM

## 2019-01-28 DIAGNOSIS — F10.20 UNCOMPLICATED ALCOHOL DEPENDENCE (HCC): ICD-10-CM

## 2019-01-28 PROCEDURE — 90837 PSYTX W PT 60 MINUTES: CPT | Performed by: SOCIAL WORKER

## 2019-01-28 NOTE — PROGRESS NOTES
"Date of Service: January 28, 2019  Time In: 1216  Time Out: 116      PROGRESS NOTE  Data:  Priya Song is a 43 y.o. female who met with the undersigned for a regularly scheduled individual outpatient therapy session by explaining she is overwhelmed by her daughters boyfriend breaking up with her, having family in this past weekend and had planned on seeing her mother tonight but now has a migraine.  Patient discusses she feels that her migraine stem from so many things going on over the weekend and feeling overwhelmed with stress and anxiety.  Patient discussed due to increased stress and anxiety she has had increase in depression.  Patient discussed she worries about her daughter as her daughter doesn't discuss her feelings and emotions with her.  Patient discussed her daughter was not interested in dating in high school so now she has had 3 people breakup with her since starting college last year.  Patient discussed she is a private person and has a difficult time thinking that the police and her brother and neighbor/cousin knew that she had to be hospitalized due to suicidal ideations last year.  Patient [place] and that was embarrassing.  Patient states I work out at the gym with the  that arrived at my house\".     HPI: Depression, anxiety, and new health conditions, alcohol abuse      Clinical Maneuvering/Intervention:  Assisted patient in processing above session content; acknowledged and normalized patient’s thoughts, feelings, and concerns.  Assisted patient in processing her thoughts and feelings depression, anxiety, chronic pain and chronic health conditions.  Validated patient's thoughts and feelings of symptoms of depression and anxiety.   Patient denies SI, HI self-harm.  Patient is agreeable to safety plan.  Patient is agreeable to taking medications as prescribed and calling the prescriber with questions and concerns.    Patient is " aware of crisis visits as needed. Continue to educate patient on patient on reframing negative thinking and healthy coping skills to work on depressed mood and feeling overwhelmed.   Patient is to contact this office or questions and concerns.  Patient to continue to try to get in at least 3 days that she exercises a week.  Patient continuing to work on reframing negative thinking and healthy boundaries.  Educated on anxiety and ways to work against feeling the need to control situations to decrease anxiety but to use mindfulness techniques to work on being aware of what she can control in what she cannot control and work towards acceptance.  We'll see patient back in one week.    Allowed patient to freely discuss issues without interruption or judgment. Provided safe, confidential environment to facilitate the development of positive therapeutic relationship and encourage open, honest communication. Assisted patient in identifying risk factors which would indicate the need for higher level of care including thoughts to harm self or others and/or self-harming behavior and encouraged patient to contact this office, call 911, or present to the nearest emergency room should any of these events occur. Discussed crisis intervention services and means to access.  Patient adamantly and convincingly denies current suicidal or homicidal ideation or perceptual disturbance.    Assessment     Diagnoses and all orders for this visit:    Chronic post-traumatic stress disorder (PTSD)    Moderate episode of recurrent major depressive disorder (CMS/HCC)    Uncomplicated alcohol dependence (CMS/HCC)    Insomnia due to mental condition    Other chronic pain               Mental Status Exam  Hygiene:  good  Dress:  casual  Attitude:  Cooperative  Motor Activity:  Appropriate  Speech:  Normal  Mood:  anxious, depressed and sad  Affect:  depressed and anxious  Thought Processes:  Goal directed  Thought Content:  normal  Suicidal  Thoughts:  denies  Homicidal Thoughts:  denies  Crisis Safety Plan: yes, to come to the emergency room.  Hallucinations:  denies    Patient's Support Network Includes:  , daughter and extended family    Progress toward goal: Not at goal    Functional Status: Moderate impairment     Prognosis: Fair with Ongoing Treatment     Plan         Patient will adhere to medication regimen as prescribed and report any side effects. Patient will contact this office, call 911 or present to the nearest emergency room should suicidal or homicidal ideations occur. Provide Cognitive Behavioral Therapy and Integrative Therapy to improve functioning, maintain stability, and avoid decompensation and the need for higher level of care.          Return in about 1 week (around 2/4/2019), or if symptoms worsen or fail to improve.      This document signed by Gabriella Argueta LCSW,  January 28, 2019 1:24 PM

## 2019-02-12 ENCOUNTER — OFFICE VISIT (OUTPATIENT)
Dept: PSYCHIATRY | Facility: CLINIC | Age: 44
End: 2019-02-12

## 2019-02-12 DIAGNOSIS — G89.29 OTHER CHRONIC PAIN: ICD-10-CM

## 2019-02-12 DIAGNOSIS — F51.05 INSOMNIA DUE TO MENTAL CONDITION: ICD-10-CM

## 2019-02-12 DIAGNOSIS — F43.12 CHRONIC POST-TRAUMATIC STRESS DISORDER (PTSD): Primary | ICD-10-CM

## 2019-02-12 DIAGNOSIS — F33.1 MODERATE EPISODE OF RECURRENT MAJOR DEPRESSIVE DISORDER (HCC): ICD-10-CM

## 2019-02-12 DIAGNOSIS — F10.20 UNCOMPLICATED ALCOHOL DEPENDENCE (HCC): ICD-10-CM

## 2019-02-12 PROCEDURE — 90834 PSYTX W PT 45 MINUTES: CPT | Performed by: SOCIAL WORKER

## 2019-02-12 NOTE — PROGRESS NOTES
Date of Service: February 12, 2019  Time In: 1200  Time Out: 100      PROGRESS NOTE  Data:  Priya Song is a 43 y.o. female who met with the undersigned for a regularly scheduled individual outpatient therapy session by explaining she is overwhelmed by her daughters depression and anxiety.  Patient discussed that her daughter seems increasingly depressed and does not want to open up and talk the patient her mother.  Patient discussed her and her  were worried about her daughter one night last week and he became so overwhelmed he started punching the wall and he broke his hand.  Patient discussed her and her  are not on the same page when it comes what is best for their daughter and patient explained her  will not communicate with her.  Patient discussed she is fearful that she is bit too much pressure on her daughter remember daughter's overwhelmed.  Patient says she is worried that her daughter following Lorie and not having open communication.  Patient discussed the stress of worrying about her daughter has interfered with her depression and anxiety.  Patient discussed that she took her daughter to her primary care doctor for medications assessed and her PCP told her to read the Bible and drink a smoothie.  Patient discussed that she feels her daughter's medications need to be reviewed.  Patient discussed her daughter has never really dated and is not thinking cautiously about boys that she is meeting online.  Patient discussed getting upset with her daughter and now her daughter is shutting down.      HPI: Depression, anxiety, and new health conditions, alcohol abuse      Clinical Maneuvering/Intervention:  Assisted patient in processing above session content; acknowledged and normalized patient’s thoughts, feelings, and concerns.  Assisted patient in processing her thoughts and feelings depression, anxiety, chronic pain and chronic health conditions.  Validated patient's thoughts and  feelings of symptoms of depression and anxiety.   Patient denies SI, HI self-harm.  Patient is agreeable to safety plan.  Patient is agreeable to taking medications as prescribed and calling the prescriber with questions and concerns.    Patient is aware of crisis visits as needed. Continue to educate patient on patient on reframing negative thinking and healthy coping skills to work on depressed mood and feeling overwhelmed.   Patient is to contact this office or questions and concerns.  Patient to continue to try to get in at least 3 days that she exercises a week.  Educated patient on importance of self-care of self during her daughter's difficult time.  Educated on balance and boundaries and encouraged patient to be more assertive if she feels her daughter suicidal as safety is the first priority but being encouraging and supportive being there with her daughter needs her the most important thing rather than providing the extra pressure on her reviewed ways to use assertive communication as well as supportive communication to help offer support her daughter during her daughter's difficult.  Encouraged healthy boundaries and knowing when to draw the line for safety versus support.  Encourage patient to have her daughter be seen by the therapist and psychiatrist at the [facility] when patient is agreeable.    Allowed patient to freely discuss issues without interruption or judgment. Provided safe, confidential environment to facilitate the development of positive therapeutic relationship and encourage open, honest communication. Assisted patient in identifying risk factors which would indicate the need for higher level of care including thoughts to harm self or others and/or self-harming behavior and encouraged patient to contact this office, call 911, or present to the nearest emergency room should any of these events occur. Discussed crisis intervention services and means to access.  Patient adamantly and  convincingly denies current suicidal or homicidal ideation or perceptual disturbance.    Assessment     Diagnoses and all orders for this visit:    Chronic post-traumatic stress disorder (PTSD)    Moderate episode of recurrent major depressive disorder (CMS/HCC)    Uncomplicated alcohol dependence (CMS/HCC)    Insomnia due to mental condition    Other chronic pain               Mental Status Exam  Hygiene:  good  Dress:  casual  Attitude:  Cooperative  Motor Activity:  Appropriate  Speech:  Normal  Mood:  anxious, depressed and sad  Affect:  depressed and anxious  Thought Processes:  Goal directed  Thought Content:  normal  Suicidal Thoughts:  denies  Homicidal Thoughts:  denies  Crisis Safety Plan: yes, to come to the emergency room.  Hallucinations:  denies    Patient's Support Network Includes:  , daughter and extended family    Progress toward goal: Not at goal    Functional Status: Moderate impairment     Prognosis: Fair with Ongoing Treatment     Plan         Patient will adhere to medication regimen as prescribed and report any side effects. Patient will contact this office, call 911 or present to the nearest emergency room should suicidal or homicidal ideations occur. Provide Cognitive Behavioral Therapy and Integrative Therapy to improve functioning, maintain stability, and avoid decompensation and the need for higher level of care.          Return in about 2 weeks (around 2/26/2019), or if symptoms worsen or fail to improve.      This document signed by Gabriella Argueta LCSW,  February 12, 2019 2:01 PM

## 2019-03-05 ENCOUNTER — OFFICE VISIT (OUTPATIENT)
Dept: PSYCHIATRY | Facility: CLINIC | Age: 44
End: 2019-03-05

## 2019-03-05 DIAGNOSIS — F51.05 INSOMNIA DUE TO MENTAL CONDITION: ICD-10-CM

## 2019-03-05 DIAGNOSIS — F43.12 CHRONIC POST-TRAUMATIC STRESS DISORDER (PTSD): Primary | ICD-10-CM

## 2019-03-05 DIAGNOSIS — F41.1 GENERALIZED ANXIETY DISORDER: ICD-10-CM

## 2019-03-05 DIAGNOSIS — F33.1 MODERATE EPISODE OF RECURRENT MAJOR DEPRESSIVE DISORDER (HCC): ICD-10-CM

## 2019-03-05 PROCEDURE — 90834 PSYTX W PT 45 MINUTES: CPT | Performed by: SOCIAL WORKER

## 2019-03-06 NOTE — PROGRESS NOTES
Date of Service: March 5, 2019  Time In: 100  Time Out: 155      PROGRESS NOTE  Data:  Priya Song is a 43 y.o. female who met with the undersigned for a regularly scheduled individual outpatient therapy session by explaining her depression has increased since her last session.  Patient discussed she recently had a stomach virus.  Patient discussed being concerned about her daughter and her daughter isolating herself from her parents.  Patient discussed her  broke his hand and he is off for 5 weeks.  Patient discussed their marital distress and then him being home all the time has caused increased arguments and conflict.  Patient discussed she had her disability hearing and waiting to hear the results.  Patient discussed she feels she is unable to work due to chronic pain, depression and anxiety.  Patient discussed that she has issues of resentment towards her  the first 10 years of their marriage.  Patient relived and reviewed some of the life stressors during this first 10 years of marriage.  Patient discussed she is never told anybody this before but always wanted another child but did not want to have it in a marriage that was unhappy.  Patient admits to having one episode of suicidal ideation since her last session.  Patient denies any alcohol use    HPI: Depression, anxiety, and new health conditions, alcohol abuse      Clinical Maneuvering/Intervention:  Assisted patient in processing above session content; acknowledged and normalized patient’s thoughts, feelings, and concerns.  Assisted patient in processing her thoughts and feelings depression, anxiety, chronic pain and chronic health conditions.  Validated patient's thoughts and feelings of symptoms of depression and anxiety.   Patient denies SI, HI self-harm.  Patient is agreeable to safety plan.  Patient is agreeable to taking medications as prescribed and calling the prescriber with questions and concerns.    Patient is aware of crisis  visits as needed.   Patient is to contact this office or questions and concerns.  Patient to continue to try to get in at least 3 days that she exercises a week.  Encouraged patient to work on balance and schedule.  Educated on the importance of having a scheduled.  Encouraged patient to work on having alone time to use it to reflect as well as to work on herself and self care.  Encouraged patient to use open and assertive communication with her  explaining that she needs time alone to work on self.  Encouraged patient to increase her days of working out and going to the gym alone.  Patient is to work on finding balance/schedule, work on cognitive behavioral therapy reframing negative thinking and self-love/self-care exercise    Allowed patient to freely discuss issues without interruption or judgment. Provided safe, confidential environment to facilitate the development of positive therapeutic relationship and encourage open, honest communication. Assisted patient in identifying risk factors which would indicate the need for higher level of care including thoughts to harm self or others and/or self-harming behavior and encouraged patient to contact this office, call 911, or present to the nearest emergency room should any of these events occur. Discussed crisis intervention services and means to access.  Patient adamantly and convincingly denies current suicidal or homicidal ideation or perceptual disturbance.    Assessment     Diagnoses and all orders for this visit:    Chronic post-traumatic stress disorder (PTSD)    Moderate episode of recurrent major depressive disorder (CMS/HCC)    Insomnia due to mental condition    Generalized anxiety disorder               Mental Status Exam  Hygiene:  good  Dress:  casual  Attitude:  Cooperative  Motor Activity:  Appropriate  Speech:  Normal  Mood:  anxious, depressed and sad  Affect:  depressed and anxious  Thought Processes:  Goal directed  Thought Content:   normal  Suicidal Thoughts:  denies  Homicidal Thoughts:  denies  Crisis Safety Plan: yes, to come to the emergency room.  Hallucinations:  denies    Patient's Support Network Includes:  , daughter and extended family    Progress toward goal: Not at goal    Functional Status: Moderate impairment     Prognosis: Fair with Ongoing Treatment     Plan         Patient will adhere to medication regimen as prescribed and report any side effects. Patient will contact this office, call 911 or present to the nearest emergency room should suicidal or homicidal ideations occur. Provide Cognitive Behavioral Therapy and Integrative Therapy to improve functioning, maintain stability, and avoid decompensation and the need for higher level of care.          Return in about 1 week (around 3/12/2019), or if symptoms worsen or fail to improve, for 1-2 weeks and as needed.      This document signed by Gabriella Argueta LCSW,  March 6, 2019 9:17 AM

## 2019-03-19 ENCOUNTER — OFFICE VISIT (OUTPATIENT)
Dept: PSYCHIATRY | Facility: CLINIC | Age: 44
End: 2019-03-19

## 2019-03-19 DIAGNOSIS — F43.12 CHRONIC POST-TRAUMATIC STRESS DISORDER (PTSD): Primary | ICD-10-CM

## 2019-03-19 DIAGNOSIS — F33.1 MODERATE EPISODE OF RECURRENT MAJOR DEPRESSIVE DISORDER (HCC): ICD-10-CM

## 2019-03-19 DIAGNOSIS — F51.05 INSOMNIA DUE TO MENTAL CONDITION: ICD-10-CM

## 2019-03-19 DIAGNOSIS — F41.1 GENERALIZED ANXIETY DISORDER: ICD-10-CM

## 2019-03-19 PROCEDURE — 90834 PSYTX W PT 45 MINUTES: CPT | Performed by: SOCIAL WORKER

## 2019-03-19 NOTE — PROGRESS NOTES
Date of Service: March 19, 2019  Time In: 100  Time Out: 200      PROGRESS NOTE  Data:  Priya Song is a 43 y.o. female who met with the undersigned for a regularly scheduled individual outpatient therapy session by explaining she has not slept in 3 days.  Patient discussed life stressors.  Patient discussed she wanted to talk to therapist about something and confidence and engaged in long discussion of a new life stressors that is causing increased depression.  Patient discussed marital distress.  Patient discussed it was her 's birthday yesterday.  Patient discussed she woke up today with her  being angry at her.  Patient when she asked her  what was wrong and he just ignored her.  Patient discussed and asked many questions about low, marriage, relationships during the session.  Patient was tearful throughout session related to her life stressors, increased depression and unsure how to cope with current life stressors    HPI: Depression, anxiety, and new health conditions, alcohol abuse      Clinical Maneuvering/Intervention:  Assisted patient in processing above session content; acknowledged and normalized patient’s thoughts, feelings, and concerns.  Assisted patient in processing her thoughts and feelings depression, anxiety, chronic pain and chronic health conditions.  Validated patient's thoughts and feelings of symptoms of increased depression and anxiety.   Patient denies SI, HI self-harm.  Patient is agreeable to safety plan.  Patient is agreeable to taking medications as prescribed and calling the prescriber with questions and concerns.    Patient is aware of crisis visits as needed.   Patient is to contact this office or questions and concerns.  Patient to continue to try to get in at least 3 days that she exercises a week.  Encouraged patient to work on balance and schedule.  Educated patient on the importance of using healthy coping skills and educated on coping skills that will  promote her quality of life and increased ability.  Encourage patient to work on balance.  Educated on the importance of self-love and knowing her self-worth.  Patient explains she continues to lack in this area but wants to work on it.  Educated patient on the importance of working on it and recommended a self-help book to bring to this next therapy appointment.  We will see patient in 1 week and as needed    Allowed patient to freely discuss issues without interruption or judgment. Provided safe, confidential environment to facilitate the development of positive therapeutic relationship and encourage open, honest communication. Assisted patient in identifying risk factors which would indicate the need for higher level of care including thoughts to harm self or others and/or self-harming behavior and encouraged patient to contact this office, call 911, or present to the nearest emergency room should any of these events occur. Discussed crisis intervention services and means to access.  Patient adamantly and convincingly denies current suicidal or homicidal ideation or perceptual disturbance.    Assessment     Diagnoses and all orders for this visit:    Chronic post-traumatic stress disorder (PTSD)    Moderate episode of recurrent major depressive disorder (CMS/HCC)    Insomnia due to mental condition    Generalized anxiety disorder               Mental Status Exam  Hygiene:  good  Dress:  casual  Attitude:  Cooperative  Motor Activity:  Appropriate  Speech:  Normal  Mood:  anxious, depressed and sad  Affect:  depressed and anxious  Thought Processes:  Goal directed  Thought Content:  normal  Suicidal Thoughts:  denies  Homicidal Thoughts:  denies  Crisis Safety Plan: yes, to come to the emergency room.  Hallucinations:  denies    Patient's Support Network Includes:  , daughter and extended family    Progress toward goal: Not at goal    Functional Status: Moderate impairment     Prognosis: Fair with Ongoing  Treatment     Plan         Patient will adhere to medication regimen as prescribed and report any side effects. Patient will contact this office, call 911 or present to the nearest emergency room should suicidal or homicidal ideations occur. Provide Cognitive Behavioral Therapy and Integrative Therapy to improve functioning, maintain stability, and avoid decompensation and the need for higher level of care.          Return in about 1 week (around 3/26/2019), or if symptoms worsen or fail to improve.      This document signed by Gabriella Argueta LCSW,  March 19, 2019 3:04 PM

## 2019-04-17 ENCOUNTER — OFFICE VISIT (OUTPATIENT)
Dept: PSYCHIATRY | Facility: CLINIC | Age: 44
End: 2019-04-17

## 2019-04-17 DIAGNOSIS — F41.1 GENERALIZED ANXIETY DISORDER: ICD-10-CM

## 2019-04-17 DIAGNOSIS — F33.1 MODERATE EPISODE OF RECURRENT MAJOR DEPRESSIVE DISORDER (HCC): ICD-10-CM

## 2019-04-17 DIAGNOSIS — F51.05 INSOMNIA DUE TO MENTAL CONDITION: ICD-10-CM

## 2019-04-17 DIAGNOSIS — F43.12 CHRONIC POST-TRAUMATIC STRESS DISORDER (PTSD): Primary | ICD-10-CM

## 2019-04-17 PROCEDURE — 90837 PSYTX W PT 60 MINUTES: CPT | Performed by: SOCIAL WORKER

## 2019-04-17 NOTE — PROGRESS NOTES
Date of Service: 4/17, 2019  Time In: 200  Time Out: 300      PROGRESS NOTE  Data:  Priya Song is a 43 y.o. female who met with the undersigned for a regularly scheduled individual outpatient therapy session by explaining she has felt better related to her pain and autoimmune.  Patient discussed her marriage.  Patient explained her  is very distant with her but will not communicate.  Patient discussed her mother-in-law has been in the emergency room 3 times since our last session and she took her.  Patient discussed they spent hours each time.  Patient discussed that increased stress causes of inflammatory response with her autoimmune disorder.  Patient discussed increased anxiety and waking up in the middle of night.  Patient discussed wanting to know if she should separate, get a divorce to work on her marriage.  Patient discussed the stress of being unhappy and feeling that he is unhappy as well.    HPI: Depression, anxiety, and new health conditions, alcohol abuse      Clinical Maneuvering/Intervention:  Assisted patient in processing above session content; acknowledged and normalized patient’s thoughts, feelings, and concerns.  Assisted patient in processing her thoughts and feelings depression, anxiety, chronic pain and chronic health conditions.  Validated patient's thoughts and feelings of symptoms of depression and anxiety.   Patient denies SI, HI self-harm.  Patient is agreeable to safety plan.  Patient is agreeable to taking medications as prescribed and calling the prescriber with questions and concerns.    Patient is aware of crisis visits as needed.   Patient is to contact this office or questions and concerns.  Patient to continue to try to get in at least 3 days that she exercises a week.  Encouraged patient to work on balance and schedule.  Educated patient on the importance of using healthy coping skills and educated on coping skills that will promote her quality of life and increased  ability.  Encourage patient to work on balance.  Encouraged patient to see her PCP or medication management provider to work on sleep hygiene and educated on sleep hygiene.  Educated on reframing negative thinking.  Educated on the importance of working towards having healthy communication with her .  Educated on processing and journaling her thoughts and feelings of what she wants out of her marriage.  Patient is to work on this and come back in 2 weeks.    Allowed patient to freely discuss issues without interruption or judgment. Provided safe, confidential environment to facilitate the development of positive therapeutic relationship and encourage open, honest communication. Assisted patient in identifying risk factors which would indicate the need for higher level of care including thoughts to harm self or others and/or self-harming behavior and encouraged patient to contact this office, call 911, or present to the nearest emergency room should any of these events occur. Discussed crisis intervention services and means to access.  Patient adamantly and convincingly denies current suicidal or homicidal ideation or perceptual disturbance.    Assessment     Diagnoses and all orders for this visit:    Chronic post-traumatic stress disorder (PTSD)    Moderate episode of recurrent major depressive disorder (CMS/HCC)    Insomnia due to mental condition    Generalized anxiety disorder               Mental Status Exam  Hygiene:  good  Dress:  casual  Attitude:  Cooperative  Motor Activity:  Appropriate  Speech:  Normal  Mood:  anxious, depressed and sad  Affect:  depressed and anxious  Thought Processes:  Goal directed  Thought Content:  normal  Suicidal Thoughts:  denies  Homicidal Thoughts:  denies  Crisis Safety Plan: yes, to come to the emergency room.  Hallucinations:  denies    Patient's Support Network Includes:  , daughter and extended family    Progress toward goal: Not at goal    Functional  Status: Moderate impairment     Prognosis: Fair with Ongoing Treatment     Plan         Patient will adhere to medication regimen as prescribed and report any side effects. Patient will contact this office, call 911 or present to the nearest emergency room should suicidal or homicidal ideations occur. Provide Cognitive Behavioral Therapy and Integrative Therapy to improve functioning, maintain stability, and avoid decompensation and the need for higher level of care.          Return in about 2 weeks (around 5/1/2019), or if symptoms worsen or fail to improve.      This document signed by Gabriella Argueta LCSW,  April 17, 2019 3:11 PM

## 2019-04-25 ENCOUNTER — OFFICE VISIT (OUTPATIENT)
Dept: PSYCHIATRY | Facility: CLINIC | Age: 44
End: 2019-04-25

## 2019-04-25 DIAGNOSIS — F51.05 INSOMNIA DUE TO MENTAL CONDITION: ICD-10-CM

## 2019-04-25 DIAGNOSIS — F41.1 GENERALIZED ANXIETY DISORDER: ICD-10-CM

## 2019-04-25 DIAGNOSIS — F33.1 MODERATE EPISODE OF RECURRENT MAJOR DEPRESSIVE DISORDER (HCC): ICD-10-CM

## 2019-04-25 DIAGNOSIS — F43.12 CHRONIC POST-TRAUMATIC STRESS DISORDER (PTSD): Primary | ICD-10-CM

## 2019-04-25 PROCEDURE — 90837 PSYTX W PT 60 MINUTES: CPT | Performed by: SOCIAL WORKER

## 2019-04-29 NOTE — PROGRESS NOTES
Date of Service: 4/25/2019  Time In: 100  Time Out: 212      PROGRESS NOTE  Data:  Priya Song is a 43 y.o. female who met with the undersigned for a regularly scheduled individual outpatient therapy session by explaining a small argument with her .  Patient discussed increased depression and anxiety.  Patient discussed her mother-in-law's health has improved.  Patient discussed she needs assistance with how to handle her 's side of the family and making boundaries as she explained struggling with saying no to them and doing things that are not heartfelt but then being resentful later.  Patient discussed friendships.  Patient discussed fears of ups and downs with her .  Patient denies any alcohol use or relapses.  Patient discussed her daughter will be home in the summer and she wants things to improve for her and her  that their daughter is not affected by their marital distress.    HPI: Depression, anxiety, and new health conditions, alcohol abuse      Clinical Maneuvering/Intervention:  Assisted patient in processing above session content; acknowledged and normalized patient’s thoughts, feelings, and concerns.  Assisted patient in processing her thoughts and feelings depression, anxiety, chronic pain and chronic health conditions.  Validated patient's thoughts and feelings of symptoms of depression and anxiety.   Patient denies SI, HI self-harm.  Patient is agreeable to safety plan.  Patient is agreeable to taking medications as prescribed and calling the prescriber with questions and concerns.    Patient is aware of crisis visits as needed.   Patient is to contact this office or questions and concerns.  Patient to continue to try to get in at least 3 days that she exercises a week.  Encouraged patient to work on balance and schedule.  Educated patient on the importance of using healthy coping skills and educated on coping skills that will promote her quality of life and increased  ability.  Encourage patient to work on balance.  Continue to encourage patient to see her PCP or medication management provider to work on sleep hygiene.  Patient has a sleep hygiene handout and is working on at home.  Continue to work on cognitive behavior therapy, cognitive distortions and reframing negative thinking.  Encourage marriage therapy for her and her .  She feels that her  will be resistant to this.  Encourage patient to work on assertive communication and discussed this is a possibility      Allowed patient to freely discuss issues without interruption or judgment. Provided safe, confidential environment to facilitate the development of positive therapeutic relationship and encourage open, honest communication. Assisted patient in identifying risk factors which would indicate the need for higher level of care including thoughts to harm self or others and/or self-harming behavior and encouraged patient to contact this office, call 911, or present to the nearest emergency room should any of these events occur. Discussed crisis intervention services and means to access.  Patient adamantly and convincingly denies current suicidal or homicidal ideation or perceptual disturbance.    Assessment     Diagnoses and all orders for this visit:    Chronic post-traumatic stress disorder (PTSD)    Moderate episode of recurrent major depressive disorder (CMS/HCC)    Insomnia due to mental condition    Generalized anxiety disorder               Mental Status Exam  Hygiene:  good  Dress:  casual  Attitude:  Cooperative  Motor Activity:  Appropriate  Speech:  Normal  Mood:  anxious, depressed and sad  Affect:  depressed and anxious  Thought Processes:  Goal directed  Thought Content:  normal  Suicidal Thoughts:  denies  Homicidal Thoughts:  denies  Crisis Safety Plan: yes, to come to the emergency room.  Hallucinations:  denies    Patient's Support Network Includes:  , daughter and extended  family    Progress toward goal: Not at goal    Functional Status: Moderate impairment     Prognosis: Fair with Ongoing Treatment     Plan         Patient will adhere to medication regimen as prescribed and report any side effects. Patient will contact this office, call 911 or present to the nearest emergency room should suicidal or homicidal ideations occur. Provide Cognitive Behavioral Therapy and Integrative Therapy to improve functioning, maintain stability, and avoid decompensation and the need for higher level of care.          Return in about 1 week (around 5/2/2019), or if symptoms worsen or fail to improve, for 1-2 weeks and as needed.      This document signed by Gabriella Argueta LCSW,  April 29, 2019 3:02 PM

## 2019-06-06 ENCOUNTER — OFFICE VISIT (OUTPATIENT)
Dept: PSYCHIATRY | Facility: CLINIC | Age: 44
End: 2019-06-06

## 2019-06-06 DIAGNOSIS — F33.1 MODERATE EPISODE OF RECURRENT MAJOR DEPRESSIVE DISORDER (HCC): ICD-10-CM

## 2019-06-06 DIAGNOSIS — F43.12 CHRONIC POST-TRAUMATIC STRESS DISORDER (PTSD): Primary | ICD-10-CM

## 2019-06-06 DIAGNOSIS — F51.05 INSOMNIA DUE TO MENTAL CONDITION: ICD-10-CM

## 2019-06-06 DIAGNOSIS — F41.1 GENERALIZED ANXIETY DISORDER: ICD-10-CM

## 2019-06-06 PROCEDURE — 90834 PSYTX W PT 45 MINUTES: CPT | Performed by: SOCIAL WORKER

## 2019-06-06 NOTE — PROGRESS NOTES
Date of Service: 6/6/2019  Time In: 100  Time Out: 212      PROGRESS NOTE  Data:  Priya Song is a 44 y.o. female who met with the undersigned for a regularly scheduled individual outpatient therapy session by explaining she is still seeing the friend. Patient discussed she is emotional after trying to end it and was unsuccessful. Patient discussed being 100,000 dollars in credit card debt. Patient explained her marriage and being worried about their relationship. patient discussed her daughter and having extra stress that she is home. Patient discussed her health, increased depression and anxiety.     HPI: Depression, anxiety, and new health conditions, alcohol abuse      Clinical Maneuvering/Intervention:  Assisted patient in processing above session content; acknowledged and normalized patient’s thoughts, feelings, and concerns.  Assisted patient in processing her thoughts and feelings depression, anxiety, chronic pain and chronic health conditions.  Validated patient's thoughts and feelings of symptoms of depression and anxiety.   Patient denies SI, HI self-harm.  Patient is agreeable to safety plan.  Patient is agreeable to taking medications as prescribed and calling the prescriber with questions and concerns.    Patient is aware of crisis visits as needed.   Patient is to contact this office or questions and concerns.  Patient to continue to try to get in at least 3 days that she exercises a week.  Encouraged patient to work on balance and schedule.  Educated on the importance of processing her thoughts and feelings. Encouraged patient to make a plan and then use healthy distractions to follow through with the plan to end the friendship. Educated on healthy relationships.     Allowed patient to freely discuss issues without interruption or judgment. Provided safe, confidential environment to facilitate the development of positive therapeutic relationship and encourage open, honest communication. Assisted  patient in identifying risk factors which would indicate the need for higher level of care including thoughts to harm self or others and/or self-harming behavior and encouraged patient to contact this office, call 911, or present to the nearest emergency room should any of these events occur. Discussed crisis intervention services and means to access.  Patient adamantly and convincingly denies current suicidal or homicidal ideation or perceptual disturbance.    Assessment     Diagnoses and all orders for this visit:    Chronic post-traumatic stress disorder (PTSD)    Moderate episode of recurrent major depressive disorder (CMS/HCC)    Insomnia due to mental condition    Generalized anxiety disorder               Mental Status Exam  Hygiene:  good  Dress:  casual  Attitude:  Cooperative  Motor Activity:  Appropriate  Speech:  Normal  Mood:  anxious, depressed and sad  Affect:  depressed and anxious  Thought Processes:  Goal directed  Thought Content:  normal  Suicidal Thoughts:  denies  Homicidal Thoughts:  denies  Crisis Safety Plan: yes, to come to the emergency room.  Hallucinations:  denies    Patient's Support Network Includes:  , daughter and extended family    Progress toward goal: Not at goal    Functional Status: Moderate impairment     Prognosis: Fair with Ongoing Treatment     Plan         Patient will adhere to medication regimen as prescribed and report any side effects. Patient will contact this office, call 911 or present to the nearest emergency room should suicidal or homicidal ideations occur. Provide Cognitive Behavioral Therapy and Integrative Therapy to improve functioning, maintain stability, and avoid decompensation and the need for higher level of care.          Return in about 2 weeks (around 6/20/2019), or if symptoms worsen or fail to improve.      This document signed by Gabriella Argueta LCSW,  June 6, 2019 1:13 PM

## 2019-07-03 ENCOUNTER — OFFICE VISIT (OUTPATIENT)
Dept: PSYCHIATRY | Facility: CLINIC | Age: 44
End: 2019-07-03

## 2019-07-03 DIAGNOSIS — F41.1 GENERALIZED ANXIETY DISORDER: ICD-10-CM

## 2019-07-03 DIAGNOSIS — F33.1 MODERATE EPISODE OF RECURRENT MAJOR DEPRESSIVE DISORDER (HCC): ICD-10-CM

## 2019-07-03 DIAGNOSIS — F43.12 CHRONIC POST-TRAUMATIC STRESS DISORDER (PTSD): Primary | ICD-10-CM

## 2019-07-03 DIAGNOSIS — G89.29 OTHER CHRONIC PAIN: ICD-10-CM

## 2019-07-03 DIAGNOSIS — F51.05 INSOMNIA DUE TO MENTAL CONDITION: ICD-10-CM

## 2019-07-03 PROCEDURE — 90834 PSYTX W PT 45 MINUTES: CPT | Performed by: SOCIAL WORKER

## 2019-07-03 NOTE — PROGRESS NOTES
"Date of Service: 7/3/2019  Time In: 1154  Time Out: 1234      PROGRESS NOTE  Data:  Priya Song is a 44 y.o. female who met with the undersigned for a regularly scheduled individual outpatient therapy session by explaining increased stress and anxiety. Patient discussed she has started smoking marijuana occasionally for pain. patient discussed sleep loss. Patient discussed being discouraged with her estelita conditions and states \"I think my health is only going to get worse and who is going to take care of me then\". patient discussed her  gets frustrated with her when she tells him she is not feeling well. Patient discussed she is stressed out with having her daughter at home. Patient explained her daughter lost her scholarship and is appealing it. Patient discussed she went without speaking to her friend for 2.5 weeks. patient explained she wants to go away for a few days. Patient explained that she thinks about what the future holds for her and her  and her health. Patient discussed they have the bankruptcy on hold as her  makes to much money. Patient discussed wanting to feel better and how her health interferes with her depression and anxiety.     HPI: Depression, anxiety, and new health conditions,       Clinical Maneuvering/Intervention:  Assisted patient in processing above session content; acknowledged and normalized patient’s thoughts, feelings, and concerns.  Assisted patient in processing her thoughts and feelings depression, anxiety, chronic pain and chronic health conditions.  Validated patient's thoughts and feelings of symptoms of depression and anxiety.   Patient denies SI, HI self-harm.  Patient is agreeable to safety plan.  Patient is agreeable to taking medications as prescribed and calling the prescriber with questions and concerns.    Patient is aware of crisis visits as needed.   Patient is to contact this office or questions and concerns.  Patient to continue to try to " get in at least 3 days that she exercises a week.  Educated patient on the importance of taking her power back or not giving it away to people and situation by using grounding techniques and mindfulness. Encouraged patient to assess her thoughts and feelings before reacting by taking timeouts or pushing pause.     Allowed patient to freely discuss issues without interruption or judgment. Provided safe, confidential environment to facilitate the development of positive therapeutic relationship and encourage open, honest communication. Assisted patient in identifying risk factors which would indicate the need for higher level of care including thoughts to harm self or others and/or self-harming behavior and encouraged patient to contact this office, call 911, or present to the nearest emergency room should any of these events occur. Discussed crisis intervention services and means to access.  Patient adamantly and convincingly denies current suicidal or homicidal ideation or perceptual disturbance.    Assessment     Diagnoses and all orders for this visit:    Chronic post-traumatic stress disorder (PTSD)    Moderate episode of recurrent major depressive disorder (CMS/HCC)    Insomnia due to mental condition    Generalized anxiety disorder    Other chronic pain               Mental Status Exam  Hygiene:  good  Dress:  casual  Attitude:  Cooperative  Motor Activity:  Appropriate  Speech:  Normal  Mood:  anxious, depressed and sad  Affect:  depressed and anxious  Thought Processes:  Goal directed  Thought Content:  normal  Suicidal Thoughts:  denies  Homicidal Thoughts:  denies  Crisis Safety Plan: yes, to come to the emergency room.  Hallucinations:  denies    Patient's Support Network Includes:  , daughter and extended family    Progress toward goal: Not at goal    Functional Status: Moderate impairment     Prognosis: Fair with Ongoing Treatment     Plan         Patient will adhere to medication regimen as  prescribed and report any side effects. Patient will contact this office, call 911 or present to the nearest emergency room should suicidal or homicidal ideations occur. Provide Cognitive Behavioral Therapy and Integrative Therapy to improve functioning, maintain stability, and avoid decompensation and the need for higher level of care.          Return in about 2 weeks (around 7/17/2019), or if symptoms worsen or fail to improve.      This document signed by Gabriella Argueta LCSW,  July 3, 2019 12:42 PM

## 2019-07-24 ENCOUNTER — OFFICE VISIT (OUTPATIENT)
Dept: PSYCHIATRY | Facility: CLINIC | Age: 44
End: 2019-07-24

## 2019-07-24 DIAGNOSIS — F41.1 GENERALIZED ANXIETY DISORDER: ICD-10-CM

## 2019-07-24 DIAGNOSIS — G89.29 OTHER CHRONIC PAIN: ICD-10-CM

## 2019-07-24 DIAGNOSIS — F51.05 INSOMNIA DUE TO MENTAL CONDITION: ICD-10-CM

## 2019-07-24 DIAGNOSIS — F43.12 CHRONIC POST-TRAUMATIC STRESS DISORDER (PTSD): Primary | ICD-10-CM

## 2019-07-24 DIAGNOSIS — F33.1 MODERATE EPISODE OF RECURRENT MAJOR DEPRESSIVE DISORDER (HCC): ICD-10-CM

## 2019-07-24 PROCEDURE — 90837 PSYTX W PT 60 MINUTES: CPT | Performed by: SOCIAL WORKER

## 2019-07-24 NOTE — PROGRESS NOTES
Date of Service: 7/24/2019  Time In: 820  Time Out: 925      PROGRESS NOTE  Data:  Priya Song is a 44 y.o. female who met with the undersigned for a regularly scheduled individual outpatient therapy session. Patient discussed her and her 's fighting and has been in the past few weeks.  Patient discussed her daughter's going back to school in a few weeks.  Patient discussed that they will find out if they can file for bankruptcy soon.  Patient discussed she has increased life stressors that is causing increased depression.  Patient discussed she continues to see her friend but is ending it.  Patient discussed she stopped taking medication as her  has been upset about the lack of intimacy in their marriage.  Patient discussed they were intimate approximately 3 times a day for the past few years and due to her health conditions she lacks desire and interest for sex.  Patient asked the question of the possibility of her being in full-blown menopause.  Patient discussed she is only sleeping 3-4 hours a night and has racing thoughts when trying to go to bed and go to sleep.  Patient discussed financial trouble.  Patient discussed now she is trying to work on being intimate with her  and trying even though she does not feel like it and he is being distant to her and telling her he does not have the time.    HPI: Depression, anxiety, and new health conditions,       Clinical Maneuvering/Intervention:  Assisted patient in processing above session content; acknowledged and normalized patient’s thoughts, feelings, and concerns.  Assisted patient in processing her thoughts and feelings depression, anxiety, chronic pain and chronic health conditions.  Validated patient's thoughts and feelings of symptoms of depression and anxiety.   Patient denies SI, HI self-harm.  Patient is agreeable to safety plan.  Patient is agreeable to taking medications as prescribed and calling the prescriber with questions and  concerns.    Patient is aware of crisis visits as needed.   Educated patient on the importance of self-love and self acceptance and putting her emotions and physical health is a priority.  Educated on assertive communication and the importance of her approach and not reacting as her  normally fight.  Role played ways to have healthy assertive communication and taking timeouts when needed to prevent verbal altercations.  Patient is willing to work on taking small steps to improve her mood.  Patient is to contact her prescribing nurse practitioner about getting back on her psychiatric medications.    Allowed patient to freely discuss issues without interruption or judgment. Provided safe, confidential environment to facilitate the development of positive therapeutic relationship and encourage open, honest communication. Assisted patient in identifying risk factors which would indicate the need for higher level of care including thoughts to harm self or others and/or self-harming behavior and encouraged patient to contact this office, call 911, or present to the nearest emergency room should any of these events occur. Discussed crisis intervention services and means to access.  Patient adamantly and convincingly denies current suicidal or homicidal ideation or perceptual disturbance.    Assessment     Diagnoses and all orders for this visit:    Chronic post-traumatic stress disorder (PTSD)    Moderate episode of recurrent major depressive disorder (CMS/HCC)    Insomnia due to mental condition    Generalized anxiety disorder    Other chronic pain               Mental Status Exam  Hygiene:  good  Dress:  casual  Attitude:  Cooperative  Motor Activity:  Appropriate  Speech:  Normal  Mood:  anxious, depressed and sad  Affect:  depressed and anxious  Thought Processes:  Goal directed  Thought Content:  normal  Suicidal Thoughts:  denies  Homicidal Thoughts:  denies  Crisis Safety Plan: yes, to come to the emergency  room.  Hallucinations:  denies    Patient's Support Network Includes:  , daughter and extended family    Progress toward goal: Not at goal    Functional Status: Moderate impairment     Prognosis: Fair with Ongoing Treatment     Plan         Patient will adhere to medication regimen as prescribed and report any side effects. Patient will contact this office, call 911 or present to the nearest emergency room should suicidal or homicidal ideations occur. Provide Cognitive Behavioral Therapy and Integrative Therapy to improve functioning, maintain stability, and avoid decompensation and the need for higher level of care.          Return in about 2 weeks (around 8/7/2019), or if symptoms worsen or fail to improve.      This document signed by Gabriella Argueta LCSW,  July 24, 2019 10:31 AM

## 2019-08-07 ENCOUNTER — OFFICE VISIT (OUTPATIENT)
Dept: PSYCHIATRY | Facility: CLINIC | Age: 44
End: 2019-08-07

## 2019-08-07 DIAGNOSIS — F41.1 GENERALIZED ANXIETY DISORDER: ICD-10-CM

## 2019-08-07 DIAGNOSIS — G89.29 OTHER CHRONIC PAIN: ICD-10-CM

## 2019-08-07 DIAGNOSIS — F51.05 INSOMNIA DUE TO MENTAL CONDITION: ICD-10-CM

## 2019-08-07 DIAGNOSIS — F33.1 MODERATE EPISODE OF RECURRENT MAJOR DEPRESSIVE DISORDER (HCC): ICD-10-CM

## 2019-08-07 DIAGNOSIS — F43.12 CHRONIC POST-TRAUMATIC STRESS DISORDER (PTSD): Primary | ICD-10-CM

## 2019-08-07 PROCEDURE — 90837 PSYTX W PT 60 MINUTES: CPT | Performed by: SOCIAL WORKER

## 2019-08-07 NOTE — PROGRESS NOTES
Date of Service: 8/7/2019  Time In: 1230  Time Out: 128      PROGRESS NOTE  Data:  Priya Song is a 44 y.o. female who met with the undersigned for a regularly scheduled individual outpatient therapy session. Patient discussed continued stress and life stressors.  Patient discussed she wishes she could take a vacation and get away from her reality.  Patient discussed her  cut down on his hours at work and they were able to file Chapter 7 bankruptcy.  Patient discussed her mother-in-law and father-in-law having numerous health conditions and their health declining.  Patient discussed the past few weeks she has been up spending more time with them and doing things for them.  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  Patient discussed her daughters about to go back to college and nervous about her daughter due to her daughter's lack of coping skills.  Patient discussed she feels she has failed her daughter.  Patient discussed her friendship and blocking her friend on Facebook and the phone and another friend is trying to create chaos in her life.      HPI: Depression, anxiety, and new health conditions,       Clinical Maneuvering/Intervention:  Assisted patient in processing above session content; acknowledged and normalized patient’s thoughts, feelings, and concerns.  Assisted patient in processing her thoughts and feelings depression, anxiety, chronic pain and chronic health conditions.  Validated patient's thoughts and feelings of symptoms of depression and anxiety.   Patient denies SI, HI self-harm.  Patient is agreeable to safety plan.  Patient is agreeable to taking medications as prescribed and calling the prescriber with questions and concerns.    Patient is aware of crisis visits as needed.   Educated patient on the importance of self-love and self acceptance and putting  her emotions and physical health is a priority.  Reviewed healthy and unhealthy coping skills and encourage patient to notice a difference between the 2.  Role played and encourage patient to assess what creates calmness in her life versus what creates chaos in her life and try to use or problem solving skills to make decisions based on what is healthy and preventing running on self will.  Continue to work on creating a healthy balance in her life and decreasing all or nothing thinking.  Patient is to work on decreasing life stressors and then we will work on that balance.  We will see every 2 weeks and as needed    Allowed patient to freely discuss issues without interruption or judgment. Provided safe, confidential environment to facilitate the development of positive therapeutic relationship and encourage open, honest communication. Assisted patient in identifying risk factors which would indicate the need for higher level of care including thoughts to harm self or others and/or self-harming behavior and encouraged patient to contact this office, call 911, or present to the nearest emergency room should any of these events occur. Discussed crisis intervention services and means to access.  Patient adamantly and convincingly denies current suicidal or homicidal ideation or perceptual disturbance.    Assessment     Diagnoses and all orders for this visit:    Chronic post-traumatic stress disorder (PTSD)    Moderate episode of recurrent major depressive disorder (CMS/HCC)    Insomnia due to mental condition    Generalized anxiety disorder    Other chronic pain               Mental Status Exam  Hygiene:  good  Dress:  casual  Attitude:  Cooperative  Motor Activity:  Appropriate  Speech:  Normal  Mood:  anxious, depressed and sad  Affect:  depressed and anxious  Thought Processes:  Goal directed  Thought Content:  normal  Suicidal Thoughts:  denies  Homicidal Thoughts:  denies  Crisis Safety Plan: yes, to come to the  emergency room.  Hallucinations:  denies    Patient's Support Network Includes:  , daughter and extended family    Progress toward goal: Not at goal    Functional Status: Moderate impairment     Prognosis: Fair with Ongoing Treatment     Plan         Patient will adhere to medication regimen as prescribed and report any side effects. Patient will contact this office, call 911 or present to the nearest emergency room should suicidal or homicidal ideations occur. Provide Cognitive Behavioral Therapy and Integrative Therapy to improve functioning, maintain stability, and avoid decompensation and the need for higher level of care.          Return in about 2 weeks (around 8/21/2019), or if symptoms worsen or fail to improve.      This document signed by Gabriella Argueta LCSW,  August 7, 2019 1:34 PM

## 2019-08-21 ENCOUNTER — OFFICE VISIT (OUTPATIENT)
Dept: PSYCHIATRY | Facility: CLINIC | Age: 44
End: 2019-08-21

## 2019-08-21 DIAGNOSIS — F51.05 INSOMNIA DUE TO MENTAL CONDITION: ICD-10-CM

## 2019-08-21 DIAGNOSIS — F43.12 CHRONIC POST-TRAUMATIC STRESS DISORDER (PTSD): Primary | ICD-10-CM

## 2019-08-21 DIAGNOSIS — F33.1 MODERATE EPISODE OF RECURRENT MAJOR DEPRESSIVE DISORDER (HCC): ICD-10-CM

## 2019-08-21 DIAGNOSIS — G89.29 OTHER CHRONIC PAIN: ICD-10-CM

## 2019-08-21 DIAGNOSIS — F41.1 GENERALIZED ANXIETY DISORDER: ICD-10-CM

## 2019-08-21 PROCEDURE — 90837 PSYTX W PT 60 MINUTES: CPT | Performed by: SOCIAL WORKER

## 2019-08-21 NOTE — PROGRESS NOTES
Date of Service: 8/21/2019  Time In: 1130  Time Out: 1230      PROGRESS NOTE  Data:  Priya Song is a 44 y.o. female who met with the undersigned for a regularly scheduled individual outpatient therapy session. Patient discussed increased anxiety and stress.  Patient was in tears upon arrival and off and on throughout session by explaining her and her   are  engaged in long discussions about their arguments that they have had the past few weeks.  Patient discussed she has a hard time differentiating M doing what is best for her daughter and her  are doing what is best for herself.  Patient discussed with the part of her that just wants to stay with her  as they have been  22 years and also feeling like a divorce as needed due to feeling that she is not enough.  Patient discussed the many ways that she does not feel that she needs her 's needs and discussed that she feels he has extremely high needs in the marriage.  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    HPI: Depression, anxiety, and new health conditions,       Clinical Maneuvering/Intervention:  Assisted patient in processing above session content; acknowledged and normalized patient’s thoughts, feelings, and concerns.  Assisted patient in processing her thoughts and feelings depression, anxiety, chronic pain and chronic health conditions.  Validated patient's thoughts and feelings of symptoms of depression and anxiety.   Patient denies SI, HI self-harm.  Patient is agreeable to safety plan.  Patient is agreeable to taking medications as prescribed and calling the prescriber with questions and concerns.    Patient is aware of crisis visits as needed.   Educated patient on the importance of self-love and self acceptance and putting her  emotions and physical health is a priority.  Patient continues to work on healthy coping skills versus unhealthy coping skills.  Patient is to assess between now and next session what feels good in her life and what creates more anxiety or increased depression.  Encourage patient to assess her morals and values and will discuss at next session.    Allowed patient to freely discuss issues without interruption or judgment. Provided safe, confidential environment to facilitate the development of positive therapeutic relationship and encourage open, honest communication. Assisted patient in identifying risk factors which would indicate the need for higher level of care including thoughts to harm self or others and/or self-harming behavior and encouraged patient to contact this office, call 911, or present to the nearest emergency room should any of these events occur. Discussed crisis intervention services and means to access.  Patient adamantly and convincingly denies current suicidal or homicidal ideation or perceptual disturbance.    Assessment     Diagnoses and all orders for this visit:    Chronic post-traumatic stress disorder (PTSD)    Moderate episode of recurrent major depressive disorder (CMS/HCC)    Insomnia due to mental condition    Generalized anxiety disorder    Other chronic pain               Mental Status Exam  Hygiene:  good  Dress:  casual  Attitude:  Cooperative  Motor Activity:  Appropriate  Speech:  Normal  Mood:  anxious, depressed and sad  Affect:  depressed and anxious  Thought Processes:  Goal directed  Thought Content:  normal  Suicidal Thoughts:  denies  Homicidal Thoughts:  denies  Crisis Safety Plan: yes, to come to the emergency room.  Hallucinations:  denies    Patient's Support Network Includes:  , daughter and extended family    Progress toward goal: Not at goal    Functional Status: Moderate impairment     Prognosis: Fair with Ongoing Treatment     Plan         Patient will  adhere to medication regimen as prescribed and report any side effects. Patient will contact this office, call 911 or present to the nearest emergency room should suicidal or homicidal ideations occur. Provide Cognitive Behavioral Therapy and Integrative Therapy to improve functioning, maintain stability, and avoid decompensation and the need for higher level of care.          Return in about 2 weeks (around 9/4/2019), or if symptoms worsen or fail to improve.      This document signed by Gabriella Argueta LCSW,  August 22, 2019 2:20 PM

## 2019-08-22 NOTE — TREATMENT PLAN
Multi-Disciplinary Problems (from Behavioral Health Treatment Plan)    Active Problems     Problem: Depression  Start Date: 08/22/19    Problem Details:  The patient self-scales this problem as a 7 with 10 being the worst.       Goal Priority Start Date Expected End Date End Date    Patient will demonstrate the ability to initiate new constructive life skills outside of sessions on a consistent basis. -- 08/22/19 -- --    Goal Details:  Progress toward goal:  The patient self-scales their progress related to this goal as a 7 with 10 being the worst.       Goal Intervention Frequency Start Date End Date    Assist patient in setting attainable activities of daily living goals. PRN 08/22/19 --    Goal Intervention Frequency Start Date End Date    Provide education about depression Weekly 08/22/19 --    Intervention Details:  Duration of treatment until until remission of symptoms.       Goal Intervention Frequency Start Date End Date    Assist patient in developing healthy coping strategies. Weekly 08/22/19 --    Intervention Details:  Duration of treatment until until remission of symptoms.                          I have discussed and reviewed this treatment plan with the patient.  It has been printed for signatures.

## 2019-09-04 ENCOUNTER — OFFICE VISIT (OUTPATIENT)
Dept: PSYCHIATRY | Facility: CLINIC | Age: 44
End: 2019-09-04

## 2019-09-04 DIAGNOSIS — F51.05 INSOMNIA DUE TO MENTAL CONDITION: ICD-10-CM

## 2019-09-04 DIAGNOSIS — F33.1 MODERATE EPISODE OF RECURRENT MAJOR DEPRESSIVE DISORDER (HCC): ICD-10-CM

## 2019-09-04 DIAGNOSIS — F43.12 CHRONIC POST-TRAUMATIC STRESS DISORDER (PTSD): Primary | ICD-10-CM

## 2019-09-04 DIAGNOSIS — F41.1 GENERALIZED ANXIETY DISORDER: ICD-10-CM

## 2019-09-04 DIAGNOSIS — G89.29 OTHER CHRONIC PAIN: ICD-10-CM

## 2019-09-04 PROCEDURE — 90837 PSYTX W PT 60 MINUTES: CPT | Performed by: SOCIAL WORKER

## 2019-09-04 NOTE — PROGRESS NOTES
Date of Service: 9/4/2019  Time In: 1030  Time Out: 1130      PROGRESS NOTE  Data:  Priya Song is a 44 y.o. female who met with the undersigned for a regularly scheduled individual outpatient therapy session.  Patient starts therapy session by explaining she has decided to stay with her  but feels that she made the wrong decision.  Patient discussed hard of her wants to stay due to stability and fears.  Patient discussed she has been having a lot of fears of what the future holds in the unknown has created an increase in her anxiety and depression.  Patient discussed that she is afraid after 6 months of leaving her  and he is moved on that she would want him back.  Patient discussed that he is controlling and she feels that she is walking on eggshells.  Patient has mentioned marriage therapy and he has denied needing marriage therapy.  Patient discussed they may in agreement to work on open communication but she feels that he is not being open with her.  Patient discussed that she wants to give it some time and not make a rash impulsive decision.    HPI: Depression, anxiety, and new health conditions,       Clinical Maneuvering/Intervention:  Assisted patient in processing above session content; acknowledged and normalized patient’s thoughts, feelings, and concerns.  Assisted patient in processing her thoughts and feelings depression, anxiety, chronic pain and chronic health conditions.  Validated patient's thoughts and feelings of symptoms of depression and anxiety.   Patient denies SI, HI self-harm.  Patient is agreeable to safety plan.  Patient is agreeable to taking medications as prescribed and calling the prescriber with questions and concerns.    Patient is aware of crisis visits as needed.   Educated on trauma.  Educated on the correlation between trauma and codependency.  Encourage patient to research codependency.  Gave patient assignment on self-love, self worth and finding her voice by  using self-awareness and mindfulness techniques.  We will see patient back in 2 weeks and as needed.  Patient is to process her relationship and document her thoughts and feelings/worries and concerns.      Allowed patient to freely discuss issues without interruption or judgment. Provided safe, confidential environment to facilitate the development of positive therapeutic relationship and encourage open, honest communication. Assisted patient in identifying risk factors which would indicate the need for higher level of care including thoughts to harm self or others and/or self-harming behavior and encouraged patient to contact this office, call 911, or present to the nearest emergency room should any of these events occur. Discussed crisis intervention services and means to access.  Patient adamantly and convincingly denies current suicidal or homicidal ideation or perceptual disturbance.    Assessment     Diagnoses and all orders for this visit:    Chronic post-traumatic stress disorder (PTSD)    Moderate episode of recurrent major depressive disorder (CMS/HCC)    Insomnia due to mental condition    Generalized anxiety disorder    Other chronic pain               Mental Status Exam  Hygiene:  good  Dress:  casual  Attitude:  Cooperative  Motor Activity:  Appropriate  Speech:  Normal  Mood:  anxious, depressed and sad  Affect:  depressed and anxious  Thought Processes:  Goal directed  Thought Content:  normal  Suicidal Thoughts:  denies  Homicidal Thoughts:  denies  Crisis Safety Plan: yes, to come to the emergency room.  Hallucinations:  denies    Patient's Support Network Includes:  , daughter and extended family    Progress toward goal: Not at goal    Functional Status: Moderate impairment     Prognosis: Fair with Ongoing Treatment     Plan         Patient will adhere to medication regimen as prescribed and report any side effects. Patient will contact this office, call 911 or present to the nearest  emergency room should suicidal or homicidal ideations occur. Provide Cognitive Behavioral Therapy and Integrative Therapy to improve functioning, maintain stability, and avoid decompensation and the need for higher level of care.          Return in about 2 weeks (around 9/18/2019), or if symptoms worsen or fail to improve.      This document signed by Gabriella Argueta LCSW,  September 4, 2019 11:38 AM

## 2019-09-18 ENCOUNTER — OFFICE VISIT (OUTPATIENT)
Dept: PSYCHIATRY | Facility: CLINIC | Age: 44
End: 2019-09-18

## 2019-09-18 DIAGNOSIS — F51.05 INSOMNIA DUE TO MENTAL CONDITION: ICD-10-CM

## 2019-09-18 DIAGNOSIS — F41.1 GENERALIZED ANXIETY DISORDER: ICD-10-CM

## 2019-09-18 DIAGNOSIS — G89.29 OTHER CHRONIC PAIN: ICD-10-CM

## 2019-09-18 DIAGNOSIS — F43.12 CHRONIC POST-TRAUMATIC STRESS DISORDER (PTSD): Primary | ICD-10-CM

## 2019-09-18 DIAGNOSIS — F33.1 MODERATE EPISODE OF RECURRENT MAJOR DEPRESSIVE DISORDER (HCC): ICD-10-CM

## 2019-09-18 PROCEDURE — 90837 PSYTX W PT 60 MINUTES: CPT | Performed by: SOCIAL WORKER

## 2019-09-18 NOTE — PROGRESS NOTES
"Date of Service: 9//18/19  Time In: 1230  Time Out: 130      PROGRESS NOTE  Data:  Priya Song is a 44 y.o. female who met with the undersigned for a regularly scheduled individual outpatient therapy session.  Patient starts therapy session by explaining she has been having headaches a lot lately and migraines. Patient discussed her father in law has been in the hospital and her husbands family expects her to be able to care for him and states \"I am on disability and I can barely take care of myself. Patient discussed she is not happy. Patient discussed marital distress and her  had said he would stop a certain behavior/activity and he hasn't. Patient discussed her biggest fear is hurting her daughter or . Patient discussed she doesn't think of her self when making decision and that is why she is having difficulty making decisions regarding her marriage. patient reports moderate depression and increased anxiety and rates it moderate. When assessing unresolved issues she reports her \"mother is what hurts the most\".     HPI: Depression, anxiety, and new health conditions,       Clinical Maneuvering/Intervention:  Assisted patient in processing above session content; acknowledged and normalized patient’s thoughts, feelings, and concerns.  Assisted patient in processing her thoughts and feelings depression, anxiety, chronic pain and chronic health conditions.  Validated patient's thoughts and feelings of symptoms of depression and anxiety.   Patient denies SI, HI self-harm.  Patient is agreeable to safety plan.  Patient is agreeable to taking medications as prescribed and calling the prescriber with questions and concerns.    Patient is aware of crisis visits as needed.   Gave patient an assignment on self love and self assessment of what her core values are and what she wants for her life now and in the future to be able to assist with goal setting.     Allowed patient to freely discuss issues without " interruption or judgment. Provided safe, confidential environment to facilitate the development of positive therapeutic relationship and encourage open, honest communication. Assisted patient in identifying risk factors which would indicate the need for higher level of care including thoughts to harm self or others and/or self-harming behavior and encouraged patient to contact this office, call 911, or present to the nearest emergency room should any of these events occur. Discussed crisis intervention services and means to access.  Patient adamantly and convincingly denies current suicidal or homicidal ideation or perceptual disturbance.    Assessment     Diagnoses and all orders for this visit:    Chronic post-traumatic stress disorder (PTSD)    Moderate episode of recurrent major depressive disorder (CMS/HCC)    Insomnia due to mental condition    Generalized anxiety disorder    Other chronic pain               Mental Status Exam  Hygiene:  good  Dress:  casual  Attitude:  Cooperative  Motor Activity:  Appropriate  Speech:  Normal  Mood:  anxious, depressed and sad  Affect:  depressed and anxious  Thought Processes:  Goal directed  Thought Content:  normal  Suicidal Thoughts:  denies  Homicidal Thoughts:  denies  Crisis Safety Plan: yes, to come to the emergency room.  Hallucinations:  denies    Patient's Support Network Includes:  , daughter and extended family    Progress toward goal: Not at goal    Functional Status: Moderate impairment     Prognosis: Fair with Ongoing Treatment     Plan         Patient will adhere to medication regimen as prescribed and report any side effects. Patient will contact this office, call 911 or present to the nearest emergency room should suicidal or homicidal ideations occur. Provide Cognitive Behavioral Therapy and Integrative Therapy to improve functioning, maintain stability, and avoid decompensation and the need for higher level of care.          Return in about 1 week  (around 9/25/2019) for 1-2 weeks and prn.      This document signed by Gabriella Argueta LCSW,  September 18, 2019 3:16 PM

## 2019-10-16 ENCOUNTER — OFFICE VISIT (OUTPATIENT)
Dept: PSYCHIATRY | Facility: CLINIC | Age: 44
End: 2019-10-16

## 2019-10-16 DIAGNOSIS — F41.1 GENERALIZED ANXIETY DISORDER: ICD-10-CM

## 2019-10-16 DIAGNOSIS — F43.12 CHRONIC POST-TRAUMATIC STRESS DISORDER (PTSD): Primary | ICD-10-CM

## 2019-10-16 DIAGNOSIS — F33.1 MODERATE EPISODE OF RECURRENT MAJOR DEPRESSIVE DISORDER (HCC): ICD-10-CM

## 2019-10-16 DIAGNOSIS — F51.05 INSOMNIA DUE TO MENTAL CONDITION: ICD-10-CM

## 2019-10-16 PROCEDURE — 90834 PSYTX W PT 45 MINUTES: CPT | Performed by: SOCIAL WORKER

## 2019-10-16 NOTE — PROGRESS NOTES
Date of Service:10//16/19  Time In: 1130  Time Out: 1215      PROGRESS NOTE  Data:  Priya Song is a 44 y.o. female who met with the undersigned for a regularly scheduled individual outpatient therapy session.  Patient starts therapy session by discussing marital distress, fear of leaving, fear of hurting her daughter and fear of being alone.  Patient discussed what if the grass is not greener and she regrets leaving her .  Patient discussed her  create stability for her patient discussed her thoughts and feelings of some situations that she has been in the past.  That has caused increased stress and anxiety and depression and asked questions why she would allow herself to get involved in such things.    HPI: Depression, anxiety, and new health conditions,       Clinical Maneuvering/Intervention:  Assisted patient in processing above session content; acknowledged and normalized patient’s thoughts, feelings, and concerns.  Assisted patient in processing her thoughts and feelings depression, anxiety, chronic pain and chronic health conditions.  Validated patient's thoughts and feelings of symptoms of depression and anxiety.   Patient denies SI, HI self-harm.  Patient is agreeable to safety plan.  Patient is agreeable to taking medications as prescribed and calling the prescriber with questions and concerns.    Patient is aware of crisis visits as needed.   Patient did not get homework from last session but made a new goal and working on self-love and healthy relationships.  Encourage patient to have a healthy relationship with herself  instead of trying to have someone else make her happy.  Educated on self-love exercises and encouraged patient to do analysis of self    Allowed patient to freely discuss issues without interruption or judgment. Provided safe, confidential environment to facilitate the development of positive therapeutic relationship and encourage open, honest communication. Assisted  patient in identifying risk factors which would indicate the need for higher level of care including thoughts to harm self or others and/or self-harming behavior and encouraged patient to contact this office, call 911, or present to the nearest emergency room should any of these events occur. Discussed crisis intervention services and means to access.  Patient adamantly and convincingly denies current suicidal or homicidal ideation or perceptual disturbance.    Assessment     Diagnoses and all orders for this visit:    Chronic post-traumatic stress disorder (PTSD)    Moderate episode of recurrent major depressive disorder (CMS/HCC)    Insomnia due to mental condition    Generalized anxiety disorder               Mental Status Exam  Hygiene:  good  Dress:  casual  Attitude:  Cooperative  Motor Activity:  Appropriate  Speech:  Normal  Mood:  anxious, depressed and sad  Affect:  depressed and anxious  Thought Processes:  Goal directed  Thought Content:  normal  Suicidal Thoughts:  denies  Homicidal Thoughts:  denies  Crisis Safety Plan: yes, to come to the emergency room.  Hallucinations:  denies    Patient's Support Network Includes:  , daughter and extended family    Progress toward goal: Not at goal    Functional Status: Moderate impairment     Prognosis: Fair with Ongoing Treatment     Plan         Patient will adhere to medication regimen as prescribed and report any side effects. Patient will contact this office, call 911 or present to the nearest emergency room should suicidal or homicidal ideations occur. Provide Cognitive Behavioral Therapy and Integrative Therapy to improve functioning, maintain stability, and avoid decompensation and the need for higher level of care.          Return in about 2 weeks (around 10/30/2019), or if symptoms worsen or fail to improve.      This document signed by Gabriella Argueta LCSW,  October 16, 2019 3:06 PM

## 2019-10-28 ENCOUNTER — OFFICE VISIT (OUTPATIENT)
Dept: PSYCHIATRY | Facility: CLINIC | Age: 44
End: 2019-10-28

## 2019-10-28 DIAGNOSIS — G89.29 OTHER CHRONIC PAIN: ICD-10-CM

## 2019-10-28 DIAGNOSIS — F43.12 CHRONIC POST-TRAUMATIC STRESS DISORDER (PTSD): Primary | ICD-10-CM

## 2019-10-28 DIAGNOSIS — F33.1 MODERATE EPISODE OF RECURRENT MAJOR DEPRESSIVE DISORDER (HCC): ICD-10-CM

## 2019-10-28 DIAGNOSIS — F51.05 INSOMNIA DUE TO MENTAL CONDITION: ICD-10-CM

## 2019-10-28 DIAGNOSIS — F41.1 GENERALIZED ANXIETY DISORDER: ICD-10-CM

## 2019-10-28 PROCEDURE — 90837 PSYTX W PT 60 MINUTES: CPT | Performed by: SOCIAL WORKER

## 2019-10-28 NOTE — PROGRESS NOTES
Date of Service:10//28/19  Time In: 1225  Time Out: 126      PROGRESS NOTE  Data:  Priya Song is a 44 y.o. female who met with the undersigned for a regularly scheduled individual outpatient therapy session.  Patient starts therapy session by discussing increased depression.  Patient discussed she has had increased pain and having fatigue.  Patient reports she feels like crap.  Patient discussed her neurologist Dr. Jeffers at [facility] tells her that she does not have MS but she feels that she may so she is going to get a second opinion and will be seeing Dr. Molina in [place].  Patient discussed they found lesions on her brain.  Patient discussed her bankruptcy is in process but they are able to keep her house in the cars.  Patient discussed she is going to take the money from her social security and pay off her jeep.  Patient continues to discuss marital distress.  Patient discussed a issue with a friend who wanted help processing meant she feels that she may be over thinking it her over analyzing it.  Patient discussed she is had increased headaches for the past 2 months which have been interfering with her quality of life.  Patient discussed the possibility of a divorce and feels that she holds her  back.    HPI: Depression, anxiety, and new health conditions,       Clinical Maneuvering/Intervention:  Assisted patient in processing above session content; acknowledged and normalized patient’s thoughts, feelings, and concerns.  Assisted patient in processing her thoughts and feelings depression, anxiety, chronic pain and chronic health conditions.  Validated patient's thoughts and feelings of symptoms of depression and anxiety.   Patient denies SI, HI self-harm.  Patient is agreeable to safety plan.  Patient is agreeable to taking medications as prescribed and calling the prescriber with questions and concerns.    Patient is aware of crisis visits as needed.   Patient did not get homework from  last session but made a new goal and working on self-love and healthy relationships.  Encourage patient to have a healthy relationship with herself  instead of trying to have someone else make her happy.  Educated on self-love exercises and encouraged patient to do analysis of self.  Educated on healthy relationships.  Encourage patient to assess her morals and values as that will help her make decisions related to her marital distress.  Encouraged open communication and assertive communication.  Encourage patient to work on noticing aggressive communication style and to reframe that to more of a assertive communication.  Patient is to work on healthy coping skills for chronic pain and health conditions.    Allowed patient to freely discuss issues without interruption or judgment. Provided safe, confidential environment to facilitate the development of positive therapeutic relationship and encourage open, honest communication. Assisted patient in identifying risk factors which would indicate the need for higher level of care including thoughts to harm self or others and/or self-harming behavior and encouraged patient to contact this office, call 911, or present to the nearest emergency room should any of these events occur. Discussed crisis intervention services and means to access.  Patient adamantly and convincingly denies current suicidal or homicidal ideation or perceptual disturbance.    Assessment     Diagnoses and all orders for this visit:    Chronic post-traumatic stress disorder (PTSD)    Moderate episode of recurrent major depressive disorder (CMS/HCC)    Insomnia due to mental condition    Generalized anxiety disorder    Other chronic pain               Mental Status Exam  Hygiene:  good  Dress:  casual  Attitude:  Cooperative  Motor Activity:  Appropriate  Speech:  Normal  Mood:  anxious, depressed and sad  Affect:  depressed and anxious  Thought Processes:  Goal directed  Thought Content:   normal  Suicidal Thoughts:  denies  Homicidal Thoughts:  denies  Crisis Safety Plan: yes, to come to the emergency room.  Hallucinations:  denies    Patient's Support Network Includes:  , daughter and extended family    Progress toward goal: Not at goal    Functional Status: Moderate impairment     Prognosis: Fair with Ongoing Treatment     Plan         Patient will adhere to medication regimen as prescribed and report any side effects. Patient will contact this office, call 911 or present to the nearest emergency room should suicidal or homicidal ideations occur. Provide Cognitive Behavioral Therapy and Integrative Therapy to improve functioning, maintain stability, and avoid decompensation and the need for higher level of care.          Return in about 2 weeks (around 11/11/2019), or if symptoms worsen or fail to improve.      This document signed by Gabriella Argueta LCSW,  October 28, 2019 1:35 PM

## 2019-11-11 ENCOUNTER — OFFICE VISIT (OUTPATIENT)
Dept: PSYCHIATRY | Facility: CLINIC | Age: 44
End: 2019-11-11

## 2019-11-11 DIAGNOSIS — F43.12 CHRONIC POST-TRAUMATIC STRESS DISORDER (PTSD): Primary | ICD-10-CM

## 2019-11-11 DIAGNOSIS — F41.1 GENERALIZED ANXIETY DISORDER: ICD-10-CM

## 2019-11-11 DIAGNOSIS — F51.05 INSOMNIA DUE TO MENTAL CONDITION: ICD-10-CM

## 2019-11-11 DIAGNOSIS — G89.29 OTHER CHRONIC PAIN: ICD-10-CM

## 2019-11-11 DIAGNOSIS — F33.1 MODERATE EPISODE OF RECURRENT MAJOR DEPRESSIVE DISORDER (HCC): ICD-10-CM

## 2019-11-11 PROCEDURE — 90837 PSYTX W PT 60 MINUTES: CPT | Performed by: SOCIAL WORKER

## 2019-11-11 NOTE — PROGRESS NOTES
Date of Service:11//11/19  Time In: 1215  Time Out: 120      PROGRESS NOTE  Data:  Priya Song is a 44 y.o. female who met with the undersigned for a regularly scheduled individual outpatient therapy session.  Patient starts therapy session by reporting daily headaches.  Patient discussed she made in a an appointment with a new neurologist for January 7.  Patient discussed she is looking forward to finding another physician to help her work through the possibility of having MS or another condition.  Patient discussed she has hurt her back and going to the chiropractor twice a week.  She discussed confrontation/verbal  argument with her .  Patient discussed he drinks every night and has for 20+ years.  Patient discussed he does not show her any affection or attention until he is already started drinking.  Patient gave an example of going to [place] to visit her daughter and he did not speak to her most of the trip but then when he got home he started drinking and then he was wanting to hold her hand to show her attention.  Patient discussed she has burned out on their marriage.  Patient discussed she feels that she is checking out.  Patient discussed she is caring for his mother since he is working and becoming resentful that he does not take a stand and work on putting her in the nursing home as she has expressed the amount of stress that puts on her mentally and physically.    HPI: Depression, anxiety, and new health conditions,       Clinical Maneuvering/Intervention:  Assisted patient in processing above session content; acknowledged and normalized patient’s thoughts, feelings, and concerns.  Assisted patient in processing her thoughts and feelings depression, anxiety, chronic pain and chronic health conditions.  Validated patient's thoughts and feelings of symptoms of depression and anxiety.   Patient denies SI, HI self-harm.  Patient is agreeable to safety plan.  Patient is agreeable to taking  medications as prescribed and calling the prescriber with questions and concerns.    Patient is aware of crisis visits as needed.   Patient did not get homework from last session but made a new goal and working on self-love and healthy relationships.  Encourage patient to have a healthy relationship with herself  instead of trying to have someone else make her happy.  Educated on codependent behaviors and thinking patterns.  Encourage patient to assess her relationship with her  between now and next session using the tools given by this therapist.  Continue to educate on open communication encourage patient to work on being assertive as she admits she is passive.  Educated on trauma focused therapy and encouraged patient to think about starting trauma work after she resolves marital distress as patient has a lot of trauma related to her mother that she has not worked through and has been resistant to working on.    Allowed patient to freely discuss issues without interruption or judgment. Provided safe, confidential environment to facilitate the development of positive therapeutic relationship and encourage open, honest communication. Assisted patient in identifying risk factors which would indicate the need for higher level of care including thoughts to harm self or others and/or self-harming behavior and encouraged patient to contact this office, call 911, or present to the nearest emergency room should any of these events occur. Discussed crisis intervention services and means to access.  Patient adamantly and convincingly denies current suicidal or homicidal ideation or perceptual disturbance.    Assessment     Diagnoses and all orders for this visit:    Chronic post-traumatic stress disorder (PTSD)    Moderate episode of recurrent major depressive disorder (CMS/HCC)    Insomnia due to mental condition    Generalized anxiety disorder    Other chronic pain               Mental Status Exam  Hygiene:   good  Dress:  casual  Attitude:  Cooperative  Motor Activity:  Appropriate  Speech:  Normal  Mood:  anxious, depressed and sad  Affect:  depressed and anxious  Thought Processes:  Goal directed  Thought Content:  normal  Suicidal Thoughts:  denies  Homicidal Thoughts:  denies  Crisis Safety Plan: yes, to come to the emergency room.  Hallucinations:  denies    Patient's Support Network Includes:  , daughter and extended family    Progress toward goal: Not at goal    Functional Status: Moderate impairment     Prognosis: Fair with Ongoing Treatment     Plan         Patient will adhere to medication regimen as prescribed and report any side effects. Patient will contact this office, call 911 or present to the nearest emergency room should suicidal or homicidal ideations occur. Provide Cognitive Behavioral Therapy and Integrative Therapy to improve functioning, maintain stability, and avoid decompensation and the need for higher level of care.          Return in about 2 weeks (around 11/25/2019), or if symptoms worsen or fail to improve.      This document signed by Gabriella Argueta LCSW,  November 11, 2019 1:34 PM

## 2019-11-25 ENCOUNTER — OFFICE VISIT (OUTPATIENT)
Dept: PSYCHIATRY | Facility: CLINIC | Age: 44
End: 2019-11-25

## 2019-11-25 DIAGNOSIS — F41.1 GENERALIZED ANXIETY DISORDER: ICD-10-CM

## 2019-11-25 DIAGNOSIS — G89.29 OTHER CHRONIC PAIN: ICD-10-CM

## 2019-11-25 DIAGNOSIS — F33.1 MODERATE EPISODE OF RECURRENT MAJOR DEPRESSIVE DISORDER (HCC): ICD-10-CM

## 2019-11-25 DIAGNOSIS — F51.05 INSOMNIA DUE TO MENTAL CONDITION: ICD-10-CM

## 2019-11-25 DIAGNOSIS — F43.12 CHRONIC POST-TRAUMATIC STRESS DISORDER (PTSD): Primary | ICD-10-CM

## 2019-11-25 PROCEDURE — 90837 PSYTX W PT 60 MINUTES: CPT | Performed by: SOCIAL WORKER

## 2019-11-25 NOTE — PROGRESS NOTES
Date of Service:11//25/19  Time In: 1226  Time Out: 130      PROGRESS NOTE  Data:  Priya Song is a 44 y.o. female who met with the undersigned for a regularly scheduled individual outpatient therapy session.  Patient starts therapy session by reporting she is physically and mentally exhausted.  Patient discussed she has been processing the possibility of divorce.  Patient discussed the pros and cons of getting .  Patient discussed she does not feel like she is enough for her  and he will not communicate with her.  Patient discussed her  drinks every night.  Patient discussed they do not sleep in the same bed for the past few nights.  Patient discussed she blames it on his sleeping that she just is not happy.  Patient discussed depression and anxiety.  Patient explained she feels that she is sleeping more.  Patient discussed she had a panic attack.  Patient explained she went to see her mother nursing home, her mother has Gio's disease and is nonverbal.  Patient reviewed trauma from childhood.  Patient discussed she found out this morning that who she thought was her father may not be her father and who they family suspects is her father had a son that she dated when they were younger.    HPI: Depression, anxiety, and new health conditions,       Clinical Maneuvering/Intervention:  Assisted patient in processing above session content; acknowledged and normalized patient’s thoughts, feelings, and concerns.  Assisted patient in processing her thoughts and feelings depression, anxiety, chronic pain and chronic health conditions.  Validated patient's thoughts and feelings of symptoms of depression and anxiety.   Patient denies SI, HI self-harm.  Patient is agreeable to safety plan.  Patient is agreeable to taking medications as prescribed and calling the prescriber with questions and concerns.    Patient is aware of crisis visits as needed.   Patient did not get homework from last session  but made a new goal and working on self-love and healthy relationships.  Encourage patient to have a healthy relationship with herself  instead of trying to have someone else make her happy.  Educated on codependent behaviors and thinking patterns.  Validated patient's thoughts and feelings of life stressors, past trauma and marital distress.  Educated on the importance of journaling her thoughts and feelings related to her marriage and marital distress.  Educated the importance of being open and honest with her  about her concerns.  Educated patient making plans to stay busy and to improve quality of life and having goals and plans decreases anxiety.    Allowed patient to freely discuss issues without interruption or judgment. Provided safe, confidential environment to facilitate the development of positive therapeutic relationship and encourage open, honest communication. Assisted patient in identifying risk factors which would indicate the need for higher level of care including thoughts to harm self or others and/or self-harming behavior and encouraged patient to contact this office, call 911, or present to the nearest emergency room should any of these events occur. Discussed crisis intervention services and means to access.  Patient adamantly and convincingly denies current suicidal or homicidal ideation or perceptual disturbance.    Assessment     Diagnoses and all orders for this visit:    Chronic post-traumatic stress disorder (PTSD)    Moderate episode of recurrent major depressive disorder (CMS/HCC)    Insomnia due to mental condition    Generalized anxiety disorder    Other chronic pain               Mental Status Exam  Hygiene:  good  Dress:  casual  Attitude:  Cooperative  Motor Activity:  Appropriate  Speech:  Normal  Mood:  anxious, depressed and sad  Affect:  depressed and anxious  Thought Processes:  Goal directed  Thought Content:  normal  Suicidal Thoughts:  denies  Homicidal Thoughts:   denies  Crisis Safety Plan: yes, to come to the emergency room.  Hallucinations:  denies    Patient's Support Network Includes:  , daughter and extended family    Progress toward goal: Not at goal    Functional Status: Moderate impairment     Prognosis: Fair with Ongoing Treatment     Plan         Patient will adhere to medication regimen as prescribed and report any side effects. Patient will contact this office, call 911 or present to the nearest emergency room should suicidal or homicidal ideations occur. Provide Cognitive Behavioral Therapy and Integrative Therapy to improve functioning, maintain stability, and avoid decompensation and the need for higher level of care.          Return in about 2 weeks (around 12/9/2019), or if symptoms worsen or fail to improve.      This document signed by Gabriella Argueta LCSW,  November 25, 2019 1:40 PM

## 2019-12-09 ENCOUNTER — OFFICE VISIT (OUTPATIENT)
Dept: PSYCHIATRY | Facility: CLINIC | Age: 44
End: 2019-12-09

## 2019-12-09 DIAGNOSIS — G89.29 OTHER CHRONIC PAIN: ICD-10-CM

## 2019-12-09 DIAGNOSIS — F41.1 GENERALIZED ANXIETY DISORDER: ICD-10-CM

## 2019-12-09 DIAGNOSIS — F33.1 MODERATE EPISODE OF RECURRENT MAJOR DEPRESSIVE DISORDER (HCC): ICD-10-CM

## 2019-12-09 DIAGNOSIS — F43.12 CHRONIC POST-TRAUMATIC STRESS DISORDER (PTSD): Primary | ICD-10-CM

## 2019-12-09 DIAGNOSIS — F51.05 INSOMNIA DUE TO MENTAL CONDITION: ICD-10-CM

## 2019-12-09 PROCEDURE — 90837 PSYTX W PT 60 MINUTES: CPT | Performed by: SOCIAL WORKER
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
## 2019-12-23 ENCOUNTER — OFFICE VISIT (OUTPATIENT)
Dept: PSYCHIATRY | Facility: CLINIC | Age: 44
End: 2019-12-23

## 2019-12-23 DIAGNOSIS — F51.05 INSOMNIA DUE TO MENTAL CONDITION: ICD-10-CM

## 2019-12-23 DIAGNOSIS — G89.29 OTHER CHRONIC PAIN: ICD-10-CM

## 2019-12-23 DIAGNOSIS — F33.1 MODERATE EPISODE OF RECURRENT MAJOR DEPRESSIVE DISORDER (HCC): ICD-10-CM

## 2019-12-23 DIAGNOSIS — F41.1 GENERALIZED ANXIETY DISORDER: ICD-10-CM

## 2019-12-23 DIAGNOSIS — F43.12 CHRONIC POST-TRAUMATIC STRESS DISORDER (PTSD): Primary | ICD-10-CM

## 2019-12-23 PROCEDURE — 90834 PSYTX W PT 45 MINUTES: CPT | Performed by: SOCIAL WORKER
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
## 2020-01-06 ENCOUNTER — OFFICE VISIT (OUTPATIENT)
Dept: PSYCHIATRY | Facility: CLINIC | Age: 45
End: 2020-01-06

## 2020-01-06 DIAGNOSIS — G89.29 OTHER CHRONIC PAIN: ICD-10-CM

## 2020-01-06 DIAGNOSIS — F33.1 MODERATE EPISODE OF RECURRENT MAJOR DEPRESSIVE DISORDER (HCC): ICD-10-CM

## 2020-01-06 DIAGNOSIS — F43.12 CHRONIC POST-TRAUMATIC STRESS DISORDER (PTSD): Primary | ICD-10-CM

## 2020-01-06 DIAGNOSIS — F41.1 GENERALIZED ANXIETY DISORDER: ICD-10-CM

## 2020-01-06 DIAGNOSIS — F51.05 INSOMNIA DUE TO MENTAL CONDITION: ICD-10-CM

## 2020-01-06 PROCEDURE — 90837 PSYTX W PT 60 MINUTES: CPT | Performed by: SOCIAL WORKER

## 2020-01-06 NOTE — PROGRESS NOTES
Date of Service:1/6/20  Time In: 830  Time Out: 930      PROGRESS NOTE  Data:  Priya Song is a 44 y.o. female who met with the undersigned for a regularly scheduled individual outpatient therapy session.  Patient started therapy by explaining she had a difficult holiday season.  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  Patient discussed she is getting burned out on helping with her mother-in-law.  Patient discussed that the other siblings do not help which puts a lot of stress on her and her health conditions.  Patient discussed she has her upcoming neurology appointment tomorrow with Dr. Molina in [facility].  Patient discussed she has been concerned about her daughter and her daughter's increased depression.  Patient discussed her  has a difficult time with believing depression and anxiety is a health diagnosis and says inappropriate comments about it which hurts her and her daughter.  Patient continues to discuss the possibility of divorce.  Patient discussed she ended a friendship since our last session and to restarted another friendship.  Patient reports moderate depression and moderate anxiety feeling.  Patient discussed having daily headaches which interfere with her quality of life    HPI: Depression, anxiety, and new health conditions,       Clinical Maneuvering/Intervention:  Assisted patient in processing above session content; acknowledged and normalized patient’s thoughts, feelings, and concerns.  Assisted patient in processing her thoughts and feelings depression, anxiety, chronic pain and chronic health conditions.  Validated patient's thoughts and feelings of symptoms of depression and anxiety.   Patient denies SI, HI self-harm.  Patient is agreeable to safety plan.  Patient is agreeable to taking medications as prescribed and calling the prescriber with  questions and concerns.    Patient is aware of crisis visits as needed.   Patient did not get homework from last session but made a new goal and working on self-love and healthy relationships.  Encourage patient to have a healthy relationship with herself  instead of trying to have someone else make her happy.  Educated on caregiver burnout.  Encourage patient to work on the mind-body connection and making herself and her health a priority.  Patient discussed her goals for 2020 are to get healthy mentally and physically.  Patient wants to continue to work on learning new healthy coping skills and relaxation techniques as well as work out and eat healthy.  Patient has a goal to go to the gym today and start a group for weight loss and health and she will be doing this with her .  Educated on emotional abuse and the importance of healthy communication in the home for every family member in the home.  Patient discussed her daughter went back to college and she is concerned about her and this therapist educated patient on the importance of psychotherapy for her daughter and possibly a medication management assessment.    Allowed patient to freely discuss issues without interruption or judgment. Provided safe, confidential environment to facilitate the development of positive therapeutic relationship and encourage open, honest communication. Assisted patient in identifying risk factors which would indicate the need for higher level of care including thoughts to harm self or others and/or self-harming behavior and encouraged patient to contact this office, call 911, or present to the nearest emergency room should any of these events occur. Discussed crisis intervention services and means to access.  Patient adamantly and convincingly denies current suicidal or homicidal ideation or perceptual disturbance.    Assessment     Diagnoses and all orders for this visit:    Chronic post-traumatic stress disorder  (PTSD)    Moderate episode of recurrent major depressive disorder (CMS/HCC)    Insomnia due to mental condition    Generalized anxiety disorder    Other chronic pain               Mental Status Exam  Hygiene:  good  Dress:  casual  Attitude:  Cooperative  Motor Activity:  Appropriate  Speech:  Normal  Mood:  anxious, depressed and sad  Affect:  depressed and anxious  Thought Processes:  Goal directed  Thought Content:  normal  Suicidal Thoughts:  denies  Homicidal Thoughts:  denies  Crisis Safety Plan: yes, to come to the emergency room.  Hallucinations:  denies    Patient's Support Network Includes:  , daughter and extended family    Progress toward goal: Not at goal    Functional Status: Moderate impairment     Prognosis: Fair with Ongoing Treatment     Plan         Patient will adhere to medication regimen as prescribed and report any side effects. Patient will contact this office, call 911 or present to the nearest emergency room should suicidal or homicidal ideations occur. Provide Cognitive Behavioral Therapy and Integrative Therapy to improve functioning, maintain stability, and avoid decompensation and the need for higher level of care.          Return in about 2 weeks (around 1/20/2020), or if symptoms worsen or fail to improve.      This document signed by Gabriella Argueta LCSW,  January 6, 2020 10:39 AM

## 2020-01-20 ENCOUNTER — OFFICE VISIT (OUTPATIENT)
Dept: PSYCHIATRY | Facility: CLINIC | Age: 45
End: 2020-01-20

## 2020-01-20 DIAGNOSIS — F41.1 GENERALIZED ANXIETY DISORDER: ICD-10-CM

## 2020-01-20 DIAGNOSIS — F33.1 MODERATE EPISODE OF RECURRENT MAJOR DEPRESSIVE DISORDER (HCC): ICD-10-CM

## 2020-01-20 DIAGNOSIS — G89.29 OTHER CHRONIC PAIN: ICD-10-CM

## 2020-01-20 DIAGNOSIS — F43.12 CHRONIC POST-TRAUMATIC STRESS DISORDER (PTSD): Primary | ICD-10-CM

## 2020-01-20 DIAGNOSIS — F51.05 INSOMNIA DUE TO MENTAL CONDITION: ICD-10-CM

## 2020-01-20 PROCEDURE — 90837 PSYTX W PT 60 MINUTES: CPT | Performed by: SOCIAL WORKER
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
## 2020-02-17 ENCOUNTER — OFFICE VISIT (OUTPATIENT)
Dept: PSYCHIATRY | Facility: CLINIC | Age: 45
End: 2020-02-17

## 2020-02-17 DIAGNOSIS — F43.12 CHRONIC POST-TRAUMATIC STRESS DISORDER (PTSD): Primary | ICD-10-CM

## 2020-02-17 DIAGNOSIS — F51.05 INSOMNIA DUE TO MENTAL CONDITION: ICD-10-CM

## 2020-02-17 DIAGNOSIS — F41.1 GENERALIZED ANXIETY DISORDER: ICD-10-CM

## 2020-02-17 DIAGNOSIS — G89.29 OTHER CHRONIC PAIN: ICD-10-CM

## 2020-02-17 DIAGNOSIS — F33.1 MODERATE EPISODE OF RECURRENT MAJOR DEPRESSIVE DISORDER (HCC): ICD-10-CM

## 2020-02-17 PROCEDURE — 90837 PSYTX W PT 60 MINUTES: CPT | Performed by: SOCIAL WORKER

## 2020-02-17 NOTE — PROGRESS NOTES
Date of Service:2/17/20  Time In: 1212  Time Out: 118      PROGRESS NOTE  Data:  Priya Song is a 44 y.o. female who met with the undersigned for a regularly scheduled individual outpatient therapy session.  Patient started therapy session by discussing it is been 4 weeks since she saw this therapist.  Patient discussed her and her  split when discussing divorce leaving him at home and is trying to work on rekindling their marriage.  Patient discussed she found that he was talking to someone else.  Patient says she feels like a hypocrite but she feels angry that she was caring for his mother while he was talking to someone else.  Patient discussed her mother-in-law staying in the hospital 2 weeks and then the only nursing home they could find was in [place] so she is there currently until Medicaid is established and then looking for a bed closer to home with Medicaid beds in a memory unit.  Patient is because she is concerned about her daughter, being at school and isolating    HPI: Depression, anxiety, and new health conditions,       Clinical Maneuvering/Intervention:  Assisted patient in processing above session content; acknowledged and normalized patient’s thoughts, feelings, and concerns.  Assisted patient in processing her thoughts and feelings depression, anxiety, chronic pain and chronic health conditions.  Validated patient's thoughts and feelings of symptoms of depression and anxiety.   Patient denies SI, HI self-harm.  Patient is agreeable to safety plan.  Patient is agreeable to taking medications as prescribed and calling the prescriber with questions and concerns.    Patient is aware of crisis visits as needed.   Patient did not get homework from last session but made a new goal and working on self-love and healthy relationship.  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  Reviewed the importance of taking medication as prescribed and contacting if her psychiatrist for questions and concerns related her medication.  Educated on problem-solving techniques and skills.  Educated on using her cognitive behavior therapy and reframing her thinking and assessing facts versus feelings.  Validated patient's thoughts and feelings of being out of the possible about divorce but now working on things.  Patient wants to work on open communication with her  and educated on communication skills.     Allowed patient to freely discuss issues without interruption or judgment. Provided safe, confidential environment to facilitate the development of positive therapeutic relationship and encourage open, honest communication. Assisted patient in identifying risk factors which would indicate the need for higher level of care including thoughts to harm self or others and/or self-harming behavior and encouraged patient to contact this office, call 911, or present to the nearest emergency room should any of these events occur. Discussed crisis intervention services and means to access.  Patient adamantly and convincingly denies current suicidal or homicidal ideation or perceptual disturbance.    Assessment     Diagnoses and all orders for this visit:    Chronic post-traumatic stress disorder (PTSD)    Moderate episode of recurrent major depressive disorder (CMS/HCC)    Insomnia due to mental condition    Generalized anxiety disorder    Other chronic pain               Mental Status Exam  Hygiene:  good  Dress:  casual  Attitude:  Cooperative  Motor Activity:  Appropriate  Speech:  Normal  Mood:  anxious, depressed and sad  Affect:  depressed and anxious  Thought Processes:  Goal directed  Thought Content:  normal  Suicidal Thoughts:  denies  Homicidal Thoughts:  denies  Crisis Safety Plan:  yes, to come to the emergency room.  Hallucinations:  denies    Patient's Support Network Includes:  , daughter and extended family    Progress toward goal: Not at goal    Functional Status: Moderate impairment     Prognosis: Fair with Ongoing Treatment     Plan         Patient will adhere to medication regimen as prescribed and report any side effects. Patient will contact this office, call 911 or present to the nearest emergency room should suicidal or homicidal ideations occur. Provide Cognitive Behavioral Therapy and Integrative Therapy to improve functioning, maintain stability, and avoid decompensation and the need for higher level of care.          Return in about 2 weeks (around 3/2/2020), or if symptoms worsen or fail to improve.      This document signed by Gabriella Argueta LCSW,  February 17, 2020 1:27 PM

## 2020-05-18 ENCOUNTER — OFFICE VISIT (OUTPATIENT)
Dept: PSYCHIATRY | Facility: CLINIC | Age: 45
End: 2020-05-18

## 2020-05-18 DIAGNOSIS — F51.05 INSOMNIA DUE TO MENTAL CONDITION: ICD-10-CM

## 2020-05-18 DIAGNOSIS — F43.12 CHRONIC POST-TRAUMATIC STRESS DISORDER (PTSD): Primary | ICD-10-CM

## 2020-05-18 DIAGNOSIS — F33.1 MODERATE EPISODE OF RECURRENT MAJOR DEPRESSIVE DISORDER (HCC): ICD-10-CM

## 2020-05-18 DIAGNOSIS — G89.29 OTHER CHRONIC PAIN: ICD-10-CM

## 2020-05-18 DIAGNOSIS — F41.1 GENERALIZED ANXIETY DISORDER: ICD-10-CM

## 2020-05-18 PROCEDURE — 90837 PSYTX W PT 60 MINUTES: CPT | Performed by: SOCIAL WORKER
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
## 2020-06-15 ENCOUNTER — OFFICE VISIT (OUTPATIENT)
Dept: PSYCHIATRY | Facility: CLINIC | Age: 45
End: 2020-06-15

## 2020-06-15 DIAGNOSIS — F51.05 INSOMNIA DUE TO MENTAL CONDITION: ICD-10-CM

## 2020-06-15 DIAGNOSIS — F41.1 GENERALIZED ANXIETY DISORDER: ICD-10-CM

## 2020-06-15 DIAGNOSIS — G89.29 OTHER CHRONIC PAIN: ICD-10-CM

## 2020-06-15 DIAGNOSIS — F43.12 CHRONIC POST-TRAUMATIC STRESS DISORDER (PTSD): Primary | ICD-10-CM

## 2020-06-15 DIAGNOSIS — F33.1 MODERATE EPISODE OF RECURRENT MAJOR DEPRESSIVE DISORDER (HCC): ICD-10-CM

## 2020-06-15 PROCEDURE — 90837 PSYTX W PT 60 MINUTES: CPT | Performed by: SOCIAL WORKER

## 2020-06-15 NOTE — PROGRESS NOTES
Date of Service:6/15/20  Time In: 1130  Time Out: 1225      PROGRESS NOTE  Data:  Priya Song is a 45 y.o. female who met with the undersigned for a regularly scheduled individual outpatient therapy session.  Patient started therapy session by discussing family dynamics and life stressors.  Patient discussed her and her  are going out of town this weekend with numerous other couples.  Patient discussed she will be leaving her daughter at home and her daughter's boyfriend will be there.  Patient discussed her health, migraines and taking the new migraine shot.  Patient discussed her lack of confidence and low self-esteem.  Patient discussed depressed mood fluctuates between mild to moderate.  Patient discussed anxiety is about the same with fluctuating from mild to moderate.  Patient discussed she sees a psychiatrist that is not affiliated with [facility] for her medication management but explained she is compliant with her medication regimen.  Patient denies alcohol use or any substance use is.  Patient denies perceptual disturbances.  Patient discussed abandonment and rejection from other causes fears of harm to others she does not feel like she is not worth enough for good enough.    HPI: Depression, anxiety, and new health conditions,       Clinical Maneuvering/Intervention:  Assisted patient in processing above session content; acknowledged and normalized patient’s thoughts, feelings, and concerns.  Assisted patient in processing her thoughts and feelings depression, anxiety, chronic pain and chronic health conditions.  Validated patient's thoughts and feelings of symptoms of depression and anxiety.   Patient denies SI, HI self-harm.  Patient is agreeable to safety plan.  Patient is agreeable to taking medications as prescribed and calling the prescriber with questions and concerns.    Patient is aware of crisis visits as needed.   Educated on healthy relationship/healthy marriage.  Educated on  working through her trauma and the mind-body connection to trauma.  Refer to the body keep score and encouraged her to get them book as it would be beneficial related to her chronic health issues, being on disability and unresolved trauma.  Educated on codependency.  Educated on healthy boundaries and teaching people how to treat you.  We will see patient back in 2 weeks and as needed.    Allowed patient to freely discuss issues without interruption or judgment. Provided safe, confidential environment to facilitate the development of positive therapeutic relationship and encourage open, honest communication. Assisted patient in identifying risk factors which would indicate the need for higher level of care including thoughts to harm self or others and/or self-harming behavior and encouraged patient to contact this office, call 911, or present to the nearest emergency room should any of these events occur. Discussed crisis intervention services and means to access.  Patient adamantly and convincingly denies current suicidal or homicidal ideation or perceptual disturbance.    Assessment     Diagnoses and all orders for this visit:    Chronic post-traumatic stress disorder (PTSD)    Moderate episode of recurrent major depressive disorder (CMS/HCC)    Insomnia due to mental condition    Generalized anxiety disorder    Other chronic pain               Mental Status Exam  Hygiene:  good  Dress:  casual  Attitude:  Cooperative  Motor Activity:  Appropriate  Speech:  Normal  Mood:  anxious, depressed and sad  Affect:  depressed and anxious  Thought Processes:  Goal directed  Thought Content:  normal  Suicidal Thoughts:  denies  Homicidal Thoughts:  denies  Crisis Safety Plan: yes, to come to the emergency room.  Hallucinations:  denies    Patient's Support Network Includes:  , daughter and extended family    Progress toward goal: Not at goal    Functional Status: Moderate impairment     Prognosis: Fair with Ongoing  Treatment     Plan         Patient will adhere to medication regimen as prescribed and report any side effects. Patient will contact this office, call 911 or present to the nearest emergency room should suicidal or homicidal ideations occur. Provide Cognitive Behavioral Therapy and Integrative Therapy to improve functioning, maintain stability, and avoid decompensation and the need for higher level of care.          Return in about 2 weeks (around 6/29/2020), or if symptoms worsen or fail to improve.      This document signed by Gabriella Argueta LCSW,  Natalia 15, 2020 13:12

## 2020-06-29 ENCOUNTER — OFFICE VISIT (OUTPATIENT)
Dept: PSYCHIATRY | Facility: CLINIC | Age: 45
End: 2020-06-29

## 2020-06-29 DIAGNOSIS — F41.1 GENERALIZED ANXIETY DISORDER: ICD-10-CM

## 2020-06-29 DIAGNOSIS — G89.29 OTHER CHRONIC PAIN: ICD-10-CM

## 2020-06-29 DIAGNOSIS — F33.1 MODERATE EPISODE OF RECURRENT MAJOR DEPRESSIVE DISORDER (HCC): ICD-10-CM

## 2020-06-29 DIAGNOSIS — F51.05 INSOMNIA DUE TO MENTAL CONDITION: ICD-10-CM

## 2020-06-29 DIAGNOSIS — F43.12 CHRONIC POST-TRAUMATIC STRESS DISORDER (PTSD): Primary | ICD-10-CM

## 2020-06-29 PROCEDURE — 90834 PSYTX W PT 45 MINUTES: CPT | Performed by: SOCIAL WORKER
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
## 2020-07-29 ENCOUNTER — OFFICE VISIT (OUTPATIENT)
Dept: PSYCHIATRY | Facility: CLINIC | Age: 45
End: 2020-07-29

## 2020-07-29 DIAGNOSIS — G89.29 OTHER CHRONIC PAIN: ICD-10-CM

## 2020-07-29 DIAGNOSIS — F43.12 CHRONIC POST-TRAUMATIC STRESS DISORDER (PTSD): Primary | ICD-10-CM

## 2020-07-29 DIAGNOSIS — F33.1 MODERATE EPISODE OF RECURRENT MAJOR DEPRESSIVE DISORDER (HCC): ICD-10-CM

## 2020-07-29 DIAGNOSIS — F51.05 INSOMNIA DUE TO MENTAL CONDITION: ICD-10-CM

## 2020-07-29 DIAGNOSIS — F41.1 GENERALIZED ANXIETY DISORDER: ICD-10-CM

## 2020-07-29 PROCEDURE — 90837 PSYTX W PT 60 MINUTES: CPT | Performed by: SOCIAL WORKER
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
## 2020-09-15 ENCOUNTER — OFFICE VISIT (OUTPATIENT)
Dept: PSYCHIATRY | Facility: CLINIC | Age: 45
End: 2020-09-15

## 2020-09-15 DIAGNOSIS — F51.05 INSOMNIA DUE TO MENTAL CONDITION: ICD-10-CM

## 2020-09-15 DIAGNOSIS — F43.12 CHRONIC POST-TRAUMATIC STRESS DISORDER (PTSD): Primary | ICD-10-CM

## 2020-09-15 DIAGNOSIS — F41.1 GENERALIZED ANXIETY DISORDER: ICD-10-CM

## 2020-09-15 DIAGNOSIS — G89.29 OTHER CHRONIC PAIN: ICD-10-CM

## 2020-09-15 DIAGNOSIS — F33.1 MODERATE EPISODE OF RECURRENT MAJOR DEPRESSIVE DISORDER (HCC): ICD-10-CM

## 2020-09-15 PROCEDURE — 90834 PSYTX W PT 45 MINUTES: CPT | Performed by: SOCIAL WORKER

## 2020-09-15 NOTE — PROGRESS NOTES
Date of Service:9/15/20  Time In: 830  Time Out: 930      PROGRESS NOTE  Data:  Priya Song is a 45 y.o. female who met with the undersigned for a regularly scheduled individual outpatient therapy session.  Patient started therapy session by discussing she had a recent loss that is why she had to miss 2 appointments.  Patient discussed her brother-in-law committed suicide about a month ago.  Patient discussed he was found by his daughter with gunshot wound to his head.  Patient discussed he was 83 years old.  Patient discussed his wife is in a nursing home and he has not seen her since the beginning of COVID.  Patient discussed that he had been mumbling and missing her.  Patient discussed his children have been caring for him and had home health services.  Patient engaged in long discussion of feelings of guilt shame and remorse and explained that she used to do a lot more for them that was taking a toll on her health so she has backed off and not doing it as much the past few months and allowing his biological children to do for him.  Patient discussed her  and his emotions.  Patient discussed her  struggles with showing feelings and emotions that he has had a lot of anger and irritability related to his stepfather's death.  Patient discussed her daughter is having a difficult time as she is a college.  Patient is because she is going out of town in 2 weeks to go to [place] just to get away.  HPI: Depression, anxiety, and new health conditions,       Clinical Maneuvering/Intervention:  Assisted patient in processing above session content; acknowledged and normalized patient’s thoughts, feelings, and concerns.  Assisted patient in processing her thoughts and feelings depression, anxiety, chronic pain and chronic health conditions.  Validated patient's thoughts and feelings of symptoms of depression and anxiety.   Patient denies SI, HI self-harm.  Patient is agreeable to safety plan.  Patient is  agreeable to taking medications as prescribed and calling the prescriber with questions and concerns.    Patient is aware of crisis visits as needed.  Educated on grief.  Educated on suicide.  Validated patient's thoughts and feelings related to her father loss suicide a month ago.  Educated on self-care, sleep hygiene and gave patient a goal to work towards sleep hygiene to improve sleep patterns during this difficult time.  Encourage patient to call for medication management provider if sleep does not improve within the next few days to continue working on the goals.  Patient to work on self-care while going through the difficult time after someone dies going through belongings, working with a  for state and trying to be supportive of all family members.  Will see back in 2 weeks and as needed.  Patient denies SI, HI and self-harm.  Patient is aware of crisis visits as needed.    Allowed patient to freely discuss issues without interruption or judgment. Provided safe, confidential environment to facilitate the development of positive therapeutic relationship and encourage open, honest communication. Assisted patient in identifying risk factors which would indicate the need for higher level of care including thoughts to harm self or others and/or self-harming behavior and encouraged patient to contact this office, call 911, or present to the nearest emergency room should any of these events occur. Discussed crisis intervention services and means to access.  Patient adamantly and convincingly denies current suicidal or homicidal ideation or perceptual disturbance.    Assessment     Diagnoses and all orders for this visit:    Chronic post-traumatic stress disorder (PTSD)    Moderate episode of recurrent major depressive disorder (CMS/HCC)    Insomnia due to mental condition    Generalized anxiety disorder    Other chronic pain               Mental Status Exam  Hygiene:  good  Dress:  casual  Attitude:   Cooperative  Motor Activity:  Appropriate  Speech:  Normal  Mood:  anxious, depressed and sad  Affect:  depressed and anxious  Thought Processes:  Goal directed  Thought Content:  normal  Suicidal Thoughts:  denies  Homicidal Thoughts:  denies  Crisis Safety Plan: yes, to come to the emergency room.  Hallucinations:  denies    Patient's Support Network Includes:  , daughter and extended family    Progress toward goal: Not at goal    Functional Status: Moderate impairment     Prognosis: Fair with Ongoing Treatment     Plan         Patient will adhere to medication regimen as prescribed and report any side effects. Patient will contact this office, call 911 or present to the nearest emergency room should suicidal or homicidal ideations occur. Provide Cognitive Behavioral Therapy and Integrative Therapy to improve functioning, maintain stability, and avoid decompensation and the need for higher level of care.          Return in about 2 weeks (around 9/29/2020), or if symptoms worsen or fail to improve.      This document signed by Gabriella Argueta LCSW,  September 15, 2020 09:35 EDT

## 2020-09-29 ENCOUNTER — OFFICE VISIT (OUTPATIENT)
Dept: PSYCHIATRY | Facility: CLINIC | Age: 45
End: 2020-09-29

## 2020-09-29 DIAGNOSIS — G89.29 OTHER CHRONIC PAIN: ICD-10-CM

## 2020-09-29 DIAGNOSIS — F41.1 GENERALIZED ANXIETY DISORDER: ICD-10-CM

## 2020-09-29 DIAGNOSIS — F51.05 INSOMNIA DUE TO MENTAL CONDITION: ICD-10-CM

## 2020-09-29 DIAGNOSIS — F33.1 MODERATE EPISODE OF RECURRENT MAJOR DEPRESSIVE DISORDER (HCC): ICD-10-CM

## 2020-09-29 DIAGNOSIS — F43.12 CHRONIC POST-TRAUMATIC STRESS DISORDER (PTSD): Primary | ICD-10-CM

## 2020-09-29 PROCEDURE — 90834 PSYTX W PT 45 MINUTES: CPT | Performed by: SOCIAL WORKER
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
## 2020-10-27 ENCOUNTER — OFFICE VISIT (OUTPATIENT)
Dept: PSYCHIATRY | Facility: CLINIC | Age: 45
End: 2020-10-27

## 2020-10-27 DIAGNOSIS — F41.1 GENERALIZED ANXIETY DISORDER: ICD-10-CM

## 2020-10-27 DIAGNOSIS — F51.05 INSOMNIA DUE TO MENTAL CONDITION: ICD-10-CM

## 2020-10-27 DIAGNOSIS — F33.1 MODERATE EPISODE OF RECURRENT MAJOR DEPRESSIVE DISORDER (HCC): ICD-10-CM

## 2020-10-27 DIAGNOSIS — F43.12 CHRONIC POST-TRAUMATIC STRESS DISORDER (PTSD): Primary | ICD-10-CM

## 2020-10-27 PROCEDURE — 90834 PSYTX W PT 45 MINUTES: CPT | Performed by: SOCIAL WORKER

## 2020-10-28 NOTE — PROGRESS NOTES
Date of Service:10/27/20  Time In: 1230  Time Out: 130      PROGRESS NOTE  Data:  Priya Song is a 45 y.o. female who met with the undersigned for a regularly scheduled individual outpatient therapy session.  Patient started therapy session by discussing continuing to discuss life stressors. Patient discussed depression that has increased the past few weeks. Patient discussed her marriage and feels that hasn't improved but she has been working on acceptance as she wants her marriage to work and feels that she will be unhappy her entire life. Patient discussed she feels that she needs to go back to the doctor and follow up with Rheumatology as they were thinking she doesn't have firbomylgia and unsure of what she has. Patient discussed she is on disability now and that has helped. Patient discussed her relationship with her family and how she is estranged from her sister and mother. Patient discussed her relationship with her daughter and how she feels her daughter tries to not come home from college as she doesn't want to spend time with them or home. Patient discussed wanting to sleep a lot and feels her pain has increased due to weather change.       Clinical Maneuvering/Intervention:  Assisted patient in processing above session content; acknowledged and normalized patient’s thoughts, feelings, and concerns.  Assisted patient in processing her thoughts and feelings depression, anxiety, chronic pain and chronic health conditions.  Validated patient's thoughts and feelings of symptoms of depression and anxiety.   Patient denies SI, HI self-harm.  Patient is agreeable to safety plan.  Patient is agreeable to taking medications as prescribed and calling the prescriber with questions and concerns.    Patient is aware of crisis visits as needed. Educated on the mind body connection and modality and encouraged patent to wok on promoting quality of life by making goals based off of the mind body connection and  mindfulness skills discussed today. We will see patient back in 2 weeks and as needed    Allowed patient to freely discuss issues without interruption or judgment. Provided safe, confidential environment to facilitate the development of positive therapeutic relationship and encourage open, honest communication. Assisted patient in identifying risk factors which would indicate the need for higher level of care including thoughts to harm self or others and/or self-harming behavior and encouraged patient to contact this office, call 911, or present to the nearest emergency room should any of these events occur. Discussed crisis intervention services and means to access.  Patient adamantly and convincingly denies current suicidal or homicidal ideation or perceptual disturbance.    Assessment     Diagnoses and all orders for this visit:    1. Chronic post-traumatic stress disorder (PTSD) (Primary)    2. Moderate episode of recurrent major depressive disorder (CMS/HCC)    3. Insomnia due to mental condition    4. Generalized anxiety disorder               Mental Status Exam  Hygiene:  good  Dress:  casual  Attitude:  Cooperative  Motor Activity:  Appropriate  Speech:  Normal  Mood:  anxious, depressed and sad  Affect:  depressed and anxious  Thought Processes:  Goal directed  Thought Content:  normal  Suicidal Thoughts:  denies  Homicidal Thoughts:  denies  Crisis Safety Plan: yes, to come to the emergency room.  Hallucinations:  denies    Patient's Support Network Includes:  , daughter and extended family    Progress toward goal: Not at goal    Functional Status: Moderate impairment     Prognosis: Fair with Ongoing Treatment     Plan         Patient will adhere to medication regimen as prescribed and report any side effects. Patient will contact this office, call 911 or present to the nearest emergency room should suicidal or homicidal ideations occur. Provide Cognitive Behavioral Therapy and Integrative Therapy  to improve functioning, maintain stability, and avoid decompensation and the need for higher level of care.          Return in about 2 weeks (around 11/10/2020), or if symptoms worsen or fail to improve.      This document signed by Gabriella Argueta LCSW,  October 28, 2020 09:46 EDT

## 2020-11-24 ENCOUNTER — OFFICE VISIT (OUTPATIENT)
Dept: PSYCHIATRY | Facility: CLINIC | Age: 45
End: 2020-11-24

## 2020-11-24 DIAGNOSIS — F33.1 MODERATE EPISODE OF RECURRENT MAJOR DEPRESSIVE DISORDER (HCC): ICD-10-CM

## 2020-11-24 DIAGNOSIS — F41.1 GENERALIZED ANXIETY DISORDER: ICD-10-CM

## 2020-11-24 DIAGNOSIS — F43.12 CHRONIC POST-TRAUMATIC STRESS DISORDER (PTSD): Primary | ICD-10-CM

## 2020-11-24 DIAGNOSIS — G89.29 OTHER CHRONIC PAIN: ICD-10-CM

## 2020-11-24 DIAGNOSIS — F51.05 INSOMNIA DUE TO MENTAL CONDITION: ICD-10-CM

## 2020-11-24 PROCEDURE — 90837 PSYTX W PT 60 MINUTES: CPT | Performed by: SOCIAL WORKER
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
## 2020-12-09 ENCOUNTER — OFFICE VISIT (OUTPATIENT)
Dept: PSYCHIATRY | Facility: CLINIC | Age: 45
End: 2020-12-09

## 2020-12-09 DIAGNOSIS — F33.1 MODERATE EPISODE OF RECURRENT MAJOR DEPRESSIVE DISORDER (HCC): ICD-10-CM

## 2020-12-09 DIAGNOSIS — G89.29 OTHER CHRONIC PAIN: ICD-10-CM

## 2020-12-09 DIAGNOSIS — F43.12 CHRONIC POST-TRAUMATIC STRESS DISORDER (PTSD): Primary | ICD-10-CM

## 2020-12-09 DIAGNOSIS — F41.1 GENERALIZED ANXIETY DISORDER: ICD-10-CM

## 2020-12-09 DIAGNOSIS — F51.05 INSOMNIA DUE TO MENTAL CONDITION: ICD-10-CM

## 2020-12-09 PROCEDURE — 90834 PSYTX W PT 45 MINUTES: CPT | Performed by: SOCIAL WORKER

## 2020-12-16 NOTE — PROGRESS NOTES
Date of Service:12/9/2020  Time In: 830  Time Out: 930      PROGRESS NOTE  Data:  Priya Song is a 45 y.o. female who met with the undersigned for a regularly scheduled individual outpatient therapy session.  Patient started therapy session by discussing having increased pain and related to fibromyalgia.  Patient discussed she was dismissed by the neurologist Dr. Molina in [place] as he explained to her that he is unable to do anything for her as he ruled out MS. patient discussed she gets migraines and headaches that interfere with her quality of life.  Patient discussed that she allow her daughter to get a cat as a therapy.  Patient engaged in long discussion about her trauma during childhood and how it affects her daily life.  Patient discussed she admits that she was working on trauma off as it is uncomfortable and increases her depression at times.  After long discussion patient discussed she avoids a but also realizes it continues to make her feel sick mentally and physically.    Clinical Maneuvering/Intervention:  Assisted patient in processing above session content; acknowledged and normalized patient’s thoughts, feelings, and concerns.  Assisted patient in processing her thoughts and feelings depression, anxiety, chronic pain and chronic health conditions.  Validated patient's thoughts and feelings of symptoms of depression and anxiety.   Patient denies SI, HI self-harm.  Patient is agreeable to safety plan.  Patient is agreeable to taking medications as prescribed and calling the prescriber with questions and concerns.    Patient is aware of crisis visits as needed. Educated on the mind body connection and modality and encouraged patent to wok on promoting quality of life by making goals based off of the mind body connection and mindfulness skills discussed today. We will see patient back in 2 weeks.  Assessed patient's level for change and encourage patient to think about what she would like to work  on going forward with this therapist.  Encourage patient to think about if she wants to work on trauma or improving the mind-body connection related to her physical pain, depression and anxiety.  Patient is to start processing her goals and report back and next session.    Allowed patient to freely discuss issues without interruption or judgment. Provided safe, confidential environment to facilitate the development of positive therapeutic relationship and encourage open, honest communication. Assisted patient in identifying risk factors which would indicate the need for higher level of care including thoughts to harm self or others and/or self-harming behavior and encouraged patient to contact this office, call 911, or present to the nearest emergency room should any of these events occur. Discussed crisis intervention services and means to access.  Patient adamantly and convincingly denies current suicidal or homicidal ideation or perceptual disturbance.    Assessment     Diagnoses and all orders for this visit:    1. Chronic post-traumatic stress disorder (PTSD) (Primary)    2. Moderate episode of recurrent major depressive disorder (CMS/HCC)    3. Insomnia due to mental condition    4. Generalized anxiety disorder    5. Other chronic pain               Mental Status Exam  Hygiene:  good  Dress:  casual  Attitude:  Cooperative  Motor Activity:  Appropriate  Speech:  Normal  Mood:  anxious, depressed and sad  Affect:  depressed and anxious  Thought Processes:  Goal directed  Thought Content:  normal  Suicidal Thoughts:  denies  Homicidal Thoughts:  denies  Crisis Safety Plan: yes, to come to the emergency room.  Hallucinations:  denies    Patient's Support Network Includes:  , daughter and extended family    Progress toward goal: Not at goal    Functional Status: Moderate impairment     Prognosis: Fair with Ongoing Treatment     Plan         Patient will adhere to medication regimen as prescribed and report  any side effects. Patient will contact this office, call 911 or present to the nearest emergency room should suicidal or homicidal ideations occur. Provide Cognitive Behavioral Therapy and Integrative Therapy to improve functioning, maintain stability, and avoid decompensation and the need for higher level of care.          Return in about 2 weeks (around 12/23/2020), or if symptoms worsen or fail to improve.      This document signed by Gabriella Argueta LCSW,  December 16, 2020 09:17 EST

## 2021-01-04 ENCOUNTER — OFFICE VISIT (OUTPATIENT)
Dept: PSYCHIATRY | Facility: CLINIC | Age: 46
End: 2021-01-04

## 2021-01-04 DIAGNOSIS — F33.1 MODERATE EPISODE OF RECURRENT MAJOR DEPRESSIVE DISORDER (HCC): ICD-10-CM

## 2021-01-04 DIAGNOSIS — G89.29 OTHER CHRONIC PAIN: ICD-10-CM

## 2021-01-04 DIAGNOSIS — F41.1 GENERALIZED ANXIETY DISORDER: ICD-10-CM

## 2021-01-04 DIAGNOSIS — F43.12 CHRONIC POST-TRAUMATIC STRESS DISORDER (PTSD): Primary | ICD-10-CM

## 2021-01-04 DIAGNOSIS — F51.05 INSOMNIA DUE TO MENTAL CONDITION: ICD-10-CM

## 2021-01-04 PROCEDURE — 90834 PSYTX W PT 45 MINUTES: CPT | Performed by: SOCIAL WORKER

## 2021-01-04 NOTE — PROGRESS NOTES
Date of Service:1/4/2021  Time In: 925  Time Out: 1025      PROGRESS NOTE  Data:  Priya Song is a 45 y.o. female who met with the undersigned for a regularly scheduled individual outpatient therapy session.  Patient started therapy session by discussing her Fransisca.  Patient discussed her daughter more than the other children.  Patient discussed her health and wanting to find out what is going on with her.  Patient discussed they are unsure if she have fibromyalgia.  Patient discussed one doctor has discussed lupus as a possibility based on her markers.  Patient discussed she wants to find out what is wrong and treated as she is tired of feeling lethargic, wanting to sleep 12 to 14 hours a day.  Patient discussed she has gained weight approximately 25 pounds and feels that it is related to drinking soda.  Patient discussed her and her  was on advocare and lost weight and stopped her soda habit.  Patient discussed she drinks Dr. Pepper a lot and feels that this is her biggest vice currently.  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    Clinical Maneuvering/Intervention:  Assisted patient in processing above session content; acknowledged and normalized patient’s thoughts, feelings, and concerns.  Assisted patient in processing her thoughts and feelings depression, anxiety, chronic pain and chronic health conditions.  Validated patient's thoughts and feelings of symptoms of depression and anxiety.   Patient denies SI, HI self-harm.  Patient is agreeable to safety plan.  Patient is agreeable to taking medications as prescribed and calling the prescriber with questions and concerns.    Patient is aware of crisis visits as needed. Educated on the mind body connection and modality and encouraged patent to wok on promoting quality of life by making goals based off of the mind body connection and mindfulness skills discussed today. We will see patient  back in 2 weeks.  Assisted patient in processing her thoughts and feelings related to feeling unhealthy, wanting answers and quality of life.  Educated patient on making goals for the year and we can make small goals to obtain the larger goals.  Patient would like to lose weight and feel better.  Encourage patient to sit down a list out her goals to bring back to next session.  Educated on a level of change and assessed her level of changed to ensure that she is motivated and ready to work on her goals.  Encourage patient to match up her level of change to her goals to determine what is most important to her currently.    Allowed patient to freely discuss issues without interruption or judgment. Provided safe, confidential environment to facilitate the development of positive therapeutic relationship and encourage open, honest communication. Assisted patient in identifying risk factors which would indicate the need for higher level of care including thoughts to harm self or others and/or self-harming behavior and encouraged patient to contact this office, call 911, or present to the nearest emergency room should any of these events occur. Discussed crisis intervention services and means to access.  Patient adamantly and convincingly denies current suicidal or homicidal ideation or perceptual disturbance.    Assessment     Diagnoses and all orders for this visit:    1. Chronic post-traumatic stress disorder (PTSD) (Primary)    2. Moderate episode of recurrent major depressive disorder (CMS/HCC)    3. Insomnia due to mental condition    4. Generalized anxiety disorder    5. Other chronic pain               Mental Status Exam  Hygiene:  good  Dress:  casual  Attitude:  Cooperative  Motor Activity:  Appropriate  Speech:  Normal  Mood:  anxious, depressed and sad  Affect:  depressed and anxious  Thought Processes:  Goal directed  Thought Content:  normal  Suicidal Thoughts:  denies  Homicidal Thoughts:  denies  Crisis  Safety Plan: yes, to come to the emergency room.  Hallucinations:  denies    Patient's Support Network Includes:  , daughter and extended family    Progress toward goal: Not at goal    Functional Status: Moderate impairment     Prognosis: Fair with Ongoing Treatment     Plan         Patient will adhere to medication regimen as prescribed and report any side effects. Patient will contact this office, call 911 or present to the nearest emergency room should suicidal or homicidal ideations occur. Provide Cognitive Behavioral Therapy and Integrative Therapy to improve functioning, maintain stability, and avoid decompensation and the need for higher level of care.          Return in about 2 weeks (around 1/18/2021), or if symptoms worsen or fail to improve.      This document signed by Gabriella Argueta LCSW,  January 4, 2021 10:44 EST

## 2021-02-03 ENCOUNTER — OFFICE VISIT (OUTPATIENT)
Dept: PSYCHIATRY | Facility: CLINIC | Age: 46
End: 2021-02-03

## 2021-02-03 DIAGNOSIS — F43.12 CHRONIC POST-TRAUMATIC STRESS DISORDER (PTSD): Primary | ICD-10-CM

## 2021-02-03 DIAGNOSIS — F33.1 MODERATE EPISODE OF RECURRENT MAJOR DEPRESSIVE DISORDER (HCC): ICD-10-CM

## 2021-02-03 DIAGNOSIS — F51.05 INSOMNIA DUE TO MENTAL CONDITION: ICD-10-CM

## 2021-02-03 DIAGNOSIS — G89.29 OTHER CHRONIC PAIN: ICD-10-CM

## 2021-02-03 DIAGNOSIS — F41.1 GENERALIZED ANXIETY DISORDER: ICD-10-CM

## 2021-02-03 PROCEDURE — 90834 PSYTX W PT 45 MINUTES: CPT | Performed by: SOCIAL WORKER

## 2021-02-03 NOTE — PROGRESS NOTES
Date of Service:2/3/2021  Time In: 1030  Time Out: 1130      PROGRESS NOTE  Data:  Priya Song is a 45 y.o. female who met with the undersigned for a regularly scheduled individual outpatient therapy session.  Patient started therapy session by discussing she is going to have a test tomorrow to see why she is vomiting. Patient discussed getting preop today.  Patient discussed after our appointment she will be going to get COVID tested and preop for her scope tomorrow.  Patient discussed her and her  are doing the keto diet in hopes of losing weight and feeling better about themselves.  Patient discussed she does not have any extra energy.  Patient discussed her health conditions and having a disability review currently.  Patient discussed she gets nervous just thinking about the review of her disability.  Patient discussed having trouble getting properly diagnosed as she has been through many tests and referrals to different specialties but not finding a diagnosis.  Patient discussed having a vacation coming up to [place] and then in September, the [place].    Clinical Maneuvering/Intervention:  Assisted patient in processing above session content; acknowledged and normalized patient’s thoughts, feelings, and concerns.  Assisted patient in processing her thoughts and feelings depression, anxiety, chronic pain and chronic health conditions.  Validated patient's thoughts and feelings of symptoms of depression and anxiety.   Patient denies SI, HI self-harm.  Patient is agreeable to safety plan.  Patient is agreeable to taking medications as prescribed and calling the prescriber with questions and concerns.    Patient is aware of crisis visits as needed. Educated on the mind body connection and modality and encouraged patent to wok on promoting quality of life by making goals based off of the mind body connection and mindfulness skills discussed today. We will see patient back in 2 weeks.  Assisted  patient in processing her thoughts and feelings related to feeling unhealthy, wanting answers and quality of life.  Educated patient on making goals for the year and we can make small goals to obtain the larger goals.  Validated patient's thoughts and feelings related to weight management goals and working on the keto genic diet.  Validated patient's thoughts and feelings related to her health and having a disability review and having increased anxiety about losing her disability check.  Patient discussed she feels that it is just normal anxiety/fear of the unknown.  Assisted patient processing her thoughts and feelings related to upcoming test tomorrow and preop today.  Reviewed grounding techniques.  Educated the importance of working on trauma and how it is correlated with many health conditions and patient has been resistant to working through her trauma.    Allowed patient to freely discuss issues without interruption or judgment. Provided safe, confidential environment to facilitate the development of positive therapeutic relationship and encourage open, honest communication. Assisted patient in identifying risk factors which would indicate the need for higher level of care including thoughts to harm self or others and/or self-harming behavior and encouraged patient to contact this office, call 911, or present to the nearest emergency room should any of these events occur. Discussed crisis intervention services and means to access.  Patient adamantly and convincingly denies current suicidal or homicidal ideation or perceptual disturbance.    Assessment     Diagnoses and all orders for this visit:    1. Chronic post-traumatic stress disorder (PTSD) (Primary)    2. Moderate episode of recurrent major depressive disorder (CMS/HCC)    3. Insomnia due to mental condition    4. Generalized anxiety disorder    5. Other chronic pain               Mental Status Exam  Hygiene:  good  Dress:  casual  Attitude:   Cooperative  Motor Activity:  Appropriate  Speech:  Normal  Mood:  anxious, depressed and sad  Affect:  depressed and anxious  Thought Processes:  Goal directed  Thought Content:  normal  Suicidal Thoughts:  denies  Homicidal Thoughts:  denies  Crisis Safety Plan: yes, to come to the emergency room.  Hallucinations:  denies    Patient's Support Network Includes:  , daughter and extended family    Progress toward goal: Not at goal    Functional Status: Moderate impairment     Prognosis: Fair with Ongoing Treatment     Plan         Patient will adhere to medication regimen as prescribed and report any side effects. Patient will contact this office, call 911 or present to the nearest emergency room should suicidal or homicidal ideations occur. Provide Cognitive Behavioral Therapy and Integrative Therapy to improve functioning, maintain stability, and avoid decompensation and the need for higher level of care.          Return in about 2 weeks (around 2/17/2021), or if symptoms worsen or fail to improve.      This document signed by Gabriella Argueta LCSW,  February 3, 2021 13:11 EST

## 2021-03-30 ENCOUNTER — OFFICE VISIT (OUTPATIENT)
Dept: PSYCHIATRY | Facility: CLINIC | Age: 46
End: 2021-03-30

## 2021-03-30 DIAGNOSIS — F43.12 CHRONIC POST-TRAUMATIC STRESS DISORDER (PTSD): Primary | ICD-10-CM

## 2021-03-30 DIAGNOSIS — F33.1 MODERATE EPISODE OF RECURRENT MAJOR DEPRESSIVE DISORDER (HCC): ICD-10-CM

## 2021-03-30 DIAGNOSIS — G89.29 OTHER CHRONIC PAIN: ICD-10-CM

## 2021-03-30 DIAGNOSIS — F51.05 INSOMNIA DUE TO MENTAL CONDITION: ICD-10-CM

## 2021-03-30 DIAGNOSIS — F41.1 GENERALIZED ANXIETY DISORDER: ICD-10-CM

## 2021-03-30 PROCEDURE — 90834 PSYTX W PT 45 MINUTES: CPT | Performed by: SOCIAL WORKER

## 2021-04-04 NOTE — PROGRESS NOTES
Date of Service:3/30/2021  Time In: 1130  Time Out: 1230      PROGRESS NOTE  Data:  Priya Song is a 45 y.o. female who met with the undersigned for a regularly scheduled individual outpatient therapy session.  Patient started therapy session by discussing her stress, anxiety and depression. Patient denies substance use or abuse. Patient reports she missed her appointments due to weather and cancelled them. Patient reports having to cancel one due to having surgery. Patient report having elective surgery and is still healing. Patient engaged in a long discussion of her relationship with her daughter, feeling guilty because she gets on to her a lot when she is at home for breaks and holidays. Patient discussed she lays in bed all day and lacks motivation to help her do things around the house. Patient discussed her relationship with her  and how it has improved since setting boundaries with distractions.     Clinical Maneuvering/Intervention:  Assisted patient in processing above session content; acknowledged and normalized patient’s thoughts, feelings, and concerns.  Assisted patient in processing her thoughts and feelings depression, anxiety, chronic pain and chronic health conditions.  Validated patient's thoughts and feelings of symptoms of depression and anxiety.   Patient denies SI, HI self-harm.  Patient is agreeable to safety plan.  Patient is agreeable to taking medications as prescribed and calling the prescribe with questions and concerns.    Patient is aware of crisis visits as needed. Educated on the mind body connection and modality and encouraged patent to wok on promoting quality of life by making goals based off of the mind body connection and mindfulness skills discussed today. We will see patient back in 2 weeks.  Assisted patient in processing her thoughts and feelings related to feeling unhealthy, wanting answers and quality of life.  Educated patient on making goals for the year and we  can make small goals to obtain the larger goals.  Validated patient's thoughts and feelings related to weight management goals and working on the keto genic diet.  Validated patient's thoughts and feelings related to her health and having a disability review and having increased anxiety about losing her disability check.  Patient discussed she feels that it is just normal anxiety/fear of the unknown.  Patient containing to explore her trauma hx and why the resistance to processing it further. Educate on boundary setting and healthy communication to help her relationship with her daughter.     Allowed patient to freely discuss issues without interruption or judgment. Provided safe, confidential environment to facilitate the development of positive therapeutic relationship and encourage open, honest communication. Assisted patient in identifying risk factors which would indicate the need for higher level of care including thoughts to harm self or others and/or self-harming behavior and encouraged patient to contact this office, call 911, or present to the nearest emergency room should any of these events occur. Discussed crisis intervention services and means to access.  Patient adamantly and convincingly denies current suicidal or homicidal ideation or perceptual disturbance.    Assessment     Diagnoses and all orders for this visit:    1. Chronic post-traumatic stress disorder (PTSD) (Primary)    2. Moderate episode of recurrent major depressive disorder (CMS/HCC)    3. Insomnia due to mental condition    4. Generalized anxiety disorder    5. Other chronic pain               Mental Status Exam  Hygiene:  good  Dress:  casual  Attitude:  Cooperative  Motor Activity:  Appropriate  Speech:  Normal  Mood:  anxious, depressed and sad  Affect:  depressed and anxious  Thought Processes:  Goal directed  Thought Content:  normal  Suicidal Thoughts:  denies  Homicidal Thoughts:  denies  Crisis Safety Plan: yes, to come to the  emergency room.  Hallucinations:  denies    Patient's Support Network Includes:  , daughter and extended family    Progress toward goal: Not at goal    Functional Status: Moderate impairment     Prognosis: Fair with Ongoing Treatment     Plan         Patient will adhere to medication regimen as prescribed and report any side effects. Patient will contact this office, call 911 or present to the nearest emergency room should suicidal or homicidal ideations occur. Provide Cognitive Behavioral Therapy and Integrative Therapy to improve functioning, maintain stability, and avoid decompensation and the need for higher level of care.          Return in about 2 weeks (around 4/13/2021).      This document signed by Gabriella Argueta LCSW,  April 4, 2021 01:38 EDT

## 2021-04-19 ENCOUNTER — OFFICE VISIT (OUTPATIENT)
Dept: PSYCHIATRY | Facility: CLINIC | Age: 46
End: 2021-04-19

## 2021-04-19 DIAGNOSIS — F33.1 MODERATE EPISODE OF RECURRENT MAJOR DEPRESSIVE DISORDER (HCC): ICD-10-CM

## 2021-04-19 DIAGNOSIS — F43.12 CHRONIC POST-TRAUMATIC STRESS DISORDER (PTSD): Primary | ICD-10-CM

## 2021-04-19 DIAGNOSIS — G89.29 OTHER CHRONIC PAIN: ICD-10-CM

## 2021-04-19 DIAGNOSIS — F41.1 GENERALIZED ANXIETY DISORDER: ICD-10-CM

## 2021-04-19 DIAGNOSIS — F51.05 INSOMNIA DUE TO MENTAL CONDITION: ICD-10-CM

## 2021-04-19 PROCEDURE — 90837 PSYTX W PT 60 MINUTES: CPT | Performed by: SOCIAL WORKER

## 2021-05-02 NOTE — PROGRESS NOTES
Date of Service:4/19/2021  Time In: 1130  Time Out: 1230      PROGRESS NOTE  Data:  Priya Song is a 45 y.o. female who met with the undersigned for a regularly scheduled individual outpatient therapy session.  Patient started therapy session by discussing her health, reoccurring migraines and chronic fatigue and pain. Patient discussed continues to take her antidepressant and antianxiety medication. Patient discussed grief sx, feeling like she gets stuck in trauma at times. Patient discussed depression is moderate and anxiety is moderate. Patient denies si, hi and self harm.     Clinical Maneuvering/Intervention:  Assisted patient in processing above session content; acknowledged and normalized patient’s thoughts, feelings, and concerns.  Assisted patient in processing her thoughts and feelings depression, anxiety, chronic pain and chronic health conditions.  Validated patient's thoughts and feelings of symptoms of depression and anxiety.   Patient denies SI, HI self-harm.  Patient is agreeable to safety plan.  Patient is agreeable to taking medications as prescribed and calling the prescribe with questions and concerns.    Patient is aware of crisis visits as needed. Educated on the mind body connection and modality and encouraged patent to wok on promoting quality of life by making goals based off of the mind body connection and mindfulness skills discussed today. We will see patient back in 2 weeks.  Assisted patient in processing her thoughts and feelings related to feeling unhealthy, wanting answers and quality of life.  Educated patient on making goals for the year and we can make small goals to obtain the larger goals. Educated on trauma sx, Educated on grief and the importance of working through both. Educated on problem solving techniques.     Allowed patient to freely discuss issues without interruption or judgment. Provided safe, confidential environment to facilitate the development of positive  therapeutic relationship and encourage open, honest communication. Assisted patient in identifying risk factors which would indicate the need for higher level of care including thoughts to harm self or others and/or self-harming behavior and encouraged patient to contact this office, call 911, or present to the nearest emergency room should any of these events occur. Discussed crisis intervention services and means to access.  Patient adamantly and convincingly denies current suicidal or homicidal ideation or perceptual disturbance.    Assessment     Diagnoses and all orders for this visit:    1. Chronic post-traumatic stress disorder (PTSD) (Primary)    2. Moderate episode of recurrent major depressive disorder (CMS/HCC)    3. Insomnia due to mental condition    4. Generalized anxiety disorder    5. Other chronic pain               Mental Status Exam  Hygiene:  good  Dress:  casual  Attitude:  Cooperative  Motor Activity:  Appropriate  Speech:  Normal  Mood:  anxious, depressed and sad  Affect:  depressed and anxious  Thought Processes:  Goal directed  Thought Content:  normal  Suicidal Thoughts:  denies  Homicidal Thoughts:  denies  Crisis Safety Plan: yes, to come to the emergency room.  Hallucinations:  denies    Patient's Support Network Includes:  , daughter and extended family    Progress toward goal: Not at goal    Functional Status: Moderate impairment     Prognosis: Fair with Ongoing Treatment     Plan         Patient will adhere to medication regimen as prescribed and report any side effects. Patient will contact this office, call 911 or present to the nearest emergency room should suicidal or homicidal ideations occur. Provide Cognitive Behavioral Therapy and Integrative Therapy to improve functioning, maintain stability, and avoid decompensation and the need for higher level of care.          Return in about 2 weeks (around 5/3/2021).      This document signed by Gabriella Argueta LCSW,  May 2, 2021  03:04 EDT

## 2021-05-17 ENCOUNTER — OFFICE VISIT (OUTPATIENT)
Dept: PSYCHIATRY | Facility: CLINIC | Age: 46
End: 2021-05-17

## 2021-05-17 DIAGNOSIS — G89.29 OTHER CHRONIC PAIN: ICD-10-CM

## 2021-05-17 DIAGNOSIS — F51.05 INSOMNIA DUE TO MENTAL CONDITION: ICD-10-CM

## 2021-05-17 DIAGNOSIS — F43.12 CHRONIC POST-TRAUMATIC STRESS DISORDER (PTSD): Primary | ICD-10-CM

## 2021-05-17 DIAGNOSIS — F33.1 MODERATE EPISODE OF RECURRENT MAJOR DEPRESSIVE DISORDER (HCC): ICD-10-CM

## 2021-05-17 DIAGNOSIS — F41.1 GENERALIZED ANXIETY DISORDER: ICD-10-CM

## 2021-05-17 PROCEDURE — 90837 PSYTX W PT 60 MINUTES: CPT | Performed by: SOCIAL WORKER

## 2021-05-17 NOTE — PROGRESS NOTES
Date of Service:5/17/2021  Time In: 930  Time Out: 1030      PROGRESS NOTE  Data:  Priya Song is a 46 y.o. female who met with the undersigned for a regularly scheduled individual outpatient therapy session.  Patient started therapy session by discussing her daughter's home from college for the summer.  Patient discussed having a lot of irritability and anxiety.  Patient discussed OCD symptoms have increased since she has been home.  Patient was discussed, symptoms and always wanting to shut down his thoughts and feelings to prevent having emotional distress..  Patient discussed being overwhelmed by her activities in the household and not feeling respected by her  and child.  Patient struggles with boundary setting and also is upset and says things that she is not saved and now and then feels bad for being mean.    Clinical Maneuvering/Intervention:  Assisted patient in processing above session content; acknowledged and normalized patient’s thoughts, feelings, and concerns.  Assisted patient in processing her thoughts and feelings depression, anxiety, chronic pain and chronic health conditions.  Validated patient's thoughts and feelings of symptoms of depression and anxiety.   Patient denies SI, HI self-harm.  Patient is agreeable to safety plan.  Patient is agreeable to taking medications as prescribed and calling the prescribe with questions and concerns.    Patient is aware of crisis visits as needed. Educated on the mind body connection and modality and encouraged patent to wok on promoting quality of life by making goals based off of the mind body connection and mindfulness skills discussed today. We will see patient back in 2 weeks.  Assisted patient in processing her thoughts feelings related to her stress at home and main goals to work on boundary setting and communication with  and daughter will see back in 2 weeks and as needed    Allowed patient to freely discuss issues without  interruption or judgment. Provided safe, confidential environment to facilitate the development of positive therapeutic relationship and encourage open, honest communication. Assisted patient in identifying risk factors which would indicate the need for higher level of care including thoughts to harm self or others and/or self-harming behavior and encouraged patient to contact this office, call 911, or present to the nearest emergency room should any of these events occur. Discussed crisis intervention services and means to access.  Patient adamantly and convincingly denies current suicidal or homicidal ideation or perceptual disturbance.    Assessment     Diagnoses and all orders for this visit:    1. Chronic post-traumatic stress disorder (PTSD) (Primary)    2. Moderate episode of recurrent major depressive disorder (CMS/HCC)    3. Insomnia due to mental condition    4. Generalized anxiety disorder    5. Other chronic pain               Mental Status Exam  Hygiene:  good  Dress:  casual  Attitude:  Cooperative  Motor Activity:  Appropriate  Speech:  Normal  Mood:  anxious, depressed and sad  Affect:  depressed and anxious  Thought Processes:  Goal directed  Thought Content:  normal  Suicidal Thoughts:  denies  Homicidal Thoughts:  denies  Crisis Safety Plan: yes, to come to the emergency room.  Hallucinations:  denies    Patient's Support Network Includes:  , daughter and extended family    Progress toward goal: Not at goal    Functional Status: Moderate impairment     Prognosis: Fair with Ongoing Treatment     Plan         Patient will adhere to medication regimen as prescribed and report any side effects. Patient will contact this office, call 911 or present to the nearest emergency room should suicidal or homicidal ideations occur. Provide Cognitive Behavioral Therapy and Integrative Therapy to improve functioning, maintain stability, and avoid decompensation and the need for higher level of care.           Return in about 2 weeks (around 5/31/2021), or if symptoms worsen or fail to improve.      This document signed by Gabriella Argueta LCSW,  May 17, 2021 10:39 EDT

## 2021-06-28 ENCOUNTER — OFFICE VISIT (OUTPATIENT)
Dept: PSYCHIATRY | Facility: CLINIC | Age: 46
End: 2021-06-28

## 2021-06-28 DIAGNOSIS — F41.1 GENERALIZED ANXIETY DISORDER: ICD-10-CM

## 2021-06-28 DIAGNOSIS — F51.05 INSOMNIA DUE TO MENTAL CONDITION: ICD-10-CM

## 2021-06-28 DIAGNOSIS — G89.29 OTHER CHRONIC PAIN: ICD-10-CM

## 2021-06-28 DIAGNOSIS — F33.1 MODERATE EPISODE OF RECURRENT MAJOR DEPRESSIVE DISORDER (HCC): ICD-10-CM

## 2021-06-28 DIAGNOSIS — F43.12 CHRONIC POST-TRAUMATIC STRESS DISORDER (PTSD): Primary | ICD-10-CM

## 2021-06-28 PROCEDURE — 90837 PSYTX W PT 60 MINUTES: CPT | Performed by: SOCIAL WORKER

## 2021-06-28 NOTE — PROGRESS NOTES
Date of Service:6/28/2021  Time In: 930  Time Out: 1030      PROGRESS NOTE  Data:  Priya Song is a 46 y.o. female who met with the undersigned for a regularly scheduled individual outpatient therapy session.  Patient started therapy by discussing medication with her  and daughter, and getting the estate for her father-in-law at passed away taking care of her.  Patient engaged in long discussion of feeling sad and depressed but not a lot of strong emotions related to tearfulness or crying like she normally does.  Patient engaged in long discussion of what COVID has done and caused issues with related to her father-in-law committing suicide since he cannot see his wife in a nursing home.    Clinical Maneuvering/Intervention:  Assisted patient in processing above session content; acknowledged and normalized patient’s thoughts, feelings, and concerns.  Assisted patient in processing her thoughts and feelings depression, anxiety, chronic pain and chronic health conditions.  Validated patient's thoughts and feelings of symptoms of depression and anxiety.   Patient denies SI, HI self-harm.  Patient is agreeable to safety plan.  Patient is agreeable to taking medications as prescribed and calling the prescribe with questions and concerns.    Patient is aware of crisis visits as needed.  Educated patient on mindfulness, relaxation techniques and grounding techniques to help with anxiety.  Gave patient an assignment to help with unresolved issues related to grief and loss since COVID/pandemic.  will see back in 2 weeks and as needed    Allowed patient to freely discuss issues without interruption or judgment. Provided safe, confidential environment to facilitate the development of positive therapeutic relationship and encourage open, honest communication. Assisted patient in identifying risk factors which would indicate the need for higher level of care including thoughts to harm self or others and/or self-harming  behavior and encouraged patient to contact this office, call 911, or present to the nearest emergency room should any of these events occur. Discussed crisis intervention services and means to access.  Patient adamantly and convincingly denies current suicidal or homicidal ideation or perceptual disturbance.    Assessment     Diagnoses and all orders for this visit:    1. Chronic post-traumatic stress disorder (PTSD) (Primary)    2. Moderate episode of recurrent major depressive disorder (CMS/HCC)    3. Insomnia due to mental condition    4. Generalized anxiety disorder    5. Other chronic pain               Mental Status Exam  Hygiene:  good  Dress:  casual  Attitude:  Cooperative  Motor Activity:  Appropriate  Speech:  Normal  Mood:  anxious, depressed and sad  Affect:  depressed and anxious  Thought Processes:  Goal directed  Thought Content:  normal  Suicidal Thoughts:  denies  Homicidal Thoughts:  denies  Crisis Safety Plan: yes, to come to the emergency room.  Hallucinations:  denies    Patient's Support Network Includes:  , daughter and extended family    Progress toward goal: Not at goal    Functional Status: Moderate impairment     Prognosis: Fair with Ongoing Treatment     Plan         Patient will adhere to medication regimen as prescribed and report any side effects. Patient will contact this office, call 911 or present to the nearest emergency room should suicidal or homicidal ideations occur. Provide Cognitive Behavioral Therapy and Integrative Therapy to improve functioning, maintain stability, and avoid decompensation and the need for higher level of care.          Return in about 2 weeks (around 7/12/2021), or if symptoms worsen or fail to improve, for 2-4 weeks.      This document signed by Gabriella Argueta LCSW,  June 28, 2021 10:36 EDT

## 2021-07-14 ENCOUNTER — OFFICE VISIT (OUTPATIENT)
Dept: PSYCHIATRY | Facility: CLINIC | Age: 46
End: 2021-07-14

## 2021-07-14 DIAGNOSIS — F43.12 CHRONIC POST-TRAUMATIC STRESS DISORDER (PTSD): Primary | ICD-10-CM

## 2021-07-14 DIAGNOSIS — F51.05 INSOMNIA DUE TO MENTAL CONDITION: ICD-10-CM

## 2021-07-14 DIAGNOSIS — F41.1 GENERALIZED ANXIETY DISORDER: ICD-10-CM

## 2021-07-14 DIAGNOSIS — F33.1 MODERATE EPISODE OF RECURRENT MAJOR DEPRESSIVE DISORDER (HCC): ICD-10-CM

## 2021-07-14 PROCEDURE — 90837 PSYTX W PT 60 MINUTES: CPT | Performed by: SOCIAL WORKER

## 2021-07-26 NOTE — PROGRESS NOTES
Date of Service:7/14/2021  Time In: 1030  Time Out: 1130      PROGRESS NOTE  Data:  Priya Song is a 46 y.o. female who met with the undersigned for a regularly scheduled individual outpatient therapy session.  Patient started therapy by discussing panic attacks, fears that are reoccurring after being triggered n vacation with  and daughter. Patient discussed taking her medication and trying to use her coping skills. Patient discussed she is grateful for both but the medication helpers the most during a panic attack. Patient explained her fight or flight is causing problems and relates to trauma.     Clinical Maneuvering/Intervention:  Assisted patient in processing above session content; acknowledged and normalized patient’s thoughts, feelings, and concerns.  Assisted patient in processing her thoughts and feelings depression, anxiety, chronic pain and chronic health conditions.  Validated patient's thoughts and feelings of symptoms of depression and anxiety.   Patient denies SI, HI self-harm.  Patient is agreeable to safety plan.  Patient is agreeable to taking medications as prescribed and calling the prescribe with questions and concerns.    Patient is aware of crisis visits as needed.  Educated patient on mindfulness, relaxation techniques and grounding techniques to help with anxiety. Educated on trauma and a trauma coping skill/self soothing skill. Patient to proactive an think about EMDR.  will see back in 2 weeks and as needed    Allowed patient to freely discuss issues without interruption or judgment. Provided safe, confidential environment to facilitate the development of positive therapeutic relationship and encourage open, honest communication. Assisted patient in identifying risk factors which would indicate the need for higher level of care including thoughts to harm self or others and/or self-harming behavior and encouraged patient to contact this office, call 911, or present to the  nearest emergency room should any of these events occur. Discussed crisis intervention services and means to access.  Patient adamantly and convincingly denies current suicidal or homicidal ideation or perceptual disturbance.    Assessment     Diagnoses and all orders for this visit:    1. Chronic post-traumatic stress disorder (PTSD) (Primary)    2. Moderate episode of recurrent major depressive disorder (CMS/HCC)    3. Insomnia due to mental condition    4. Generalized anxiety disorder               Mental Status Exam  Hygiene:  good  Dress:  casual  Attitude:  Cooperative  Motor Activity:  Appropriate  Speech:  Normal  Mood:  anxious, depressed and sad  Affect:  depressed and anxious  Thought Processes:  Goal directed  Thought Content:  normal  Suicidal Thoughts:  denies  Homicidal Thoughts:  denies  Crisis Safety Plan: yes, to come to the emergency room.  Hallucinations:  denies    Patient's Support Network Includes:  , daughter and extended family    Progress toward goal: Not at goal    Functional Status: Moderate impairment     Prognosis: Fair with Ongoing Treatment     Plan         Patient will adhere to medication regimen as prescribed and report any side effects. Patient will contact this office, call 911 or present to the nearest emergency room should suicidal or homicidal ideations occur. Provide Cognitive Behavioral Therapy and Integrative Therapy to improve functioning, maintain stability, and avoid decompensation and the need for higher level of care.          Return in about 2 weeks (around 7/28/2021).      This document signed by Gabriella Argueta LCSW,  July 26, 2021 08:40 EDT

## 2021-09-13 ENCOUNTER — OFFICE VISIT (OUTPATIENT)
Dept: PSYCHIATRY | Facility: CLINIC | Age: 46
End: 2021-09-13

## 2021-09-13 DIAGNOSIS — F33.1 MODERATE EPISODE OF RECURRENT MAJOR DEPRESSIVE DISORDER (HCC): ICD-10-CM

## 2021-09-13 DIAGNOSIS — F51.05 INSOMNIA DUE TO MENTAL CONDITION: ICD-10-CM

## 2021-09-13 DIAGNOSIS — F41.1 GENERALIZED ANXIETY DISORDER: ICD-10-CM

## 2021-09-13 DIAGNOSIS — F43.12 CHRONIC POST-TRAUMATIC STRESS DISORDER (PTSD): Primary | ICD-10-CM

## 2021-09-13 PROCEDURE — 90837 PSYTX W PT 60 MINUTES: CPT | Performed by: SOCIAL WORKER

## 2021-09-27 ENCOUNTER — OFFICE VISIT (OUTPATIENT)
Dept: PSYCHIATRY | Facility: CLINIC | Age: 46
End: 2021-09-27

## 2021-09-27 DIAGNOSIS — F41.1 GENERALIZED ANXIETY DISORDER: ICD-10-CM

## 2021-09-27 DIAGNOSIS — F43.12 CHRONIC POST-TRAUMATIC STRESS DISORDER (PTSD): Primary | ICD-10-CM

## 2021-09-27 DIAGNOSIS — F51.05 INSOMNIA DUE TO MENTAL CONDITION: ICD-10-CM

## 2021-09-27 DIAGNOSIS — F33.1 MODERATE EPISODE OF RECURRENT MAJOR DEPRESSIVE DISORDER (HCC): ICD-10-CM

## 2021-09-27 PROCEDURE — 90834 PSYTX W PT 45 MINUTES: CPT | Performed by: SOCIAL WORKER

## 2021-10-05 NOTE — PROGRESS NOTES
Date of Service:9/27/2021  Time In: 1130   Time Out: 1220      PROGRESS NOTE  Data:  Priya Song is a 46 y.o. female who met with the undersigned for a regularly scheduled individual outpatient therapy session.  Patient started therapy by discussing she is hurting, lacking motication and having moderate depression. patient discussed trauma sx. Patient discussed anxiety and some increased social anxiety.     Clinical Maneuvering/Intervention:  Assisted patient in processing above session content; acknowledged and normalized patient’s thoughts, feelings, and concerns.  Assisted patient in processing her thoughts and feelings depression, anxiety, chronic pain and chronic health conditions.  Validated patient's thoughts and feelings of symptoms of depression and anxiety.   Patient denies SI, HI self-harm.  Patient is agreeable to safety plan.  Patient is agreeable to taking medications as prescribed and calling the prescribe with questions and concerns.    Patient is aware of crisis visits as needed.  Educated patient on mindfulness, relaxation techniques and grounding techniques to help with anxiety. Educated on social anxiety and ways to use CBT to decrease sx.  will see back in 2 weeks and as needed    Allowed patient to freely discuss issues without interruption or judgment. Provided safe, confidential environment to facilitate the development of positive therapeutic relationship and encourage open, honest communication. Assisted patient in identifying risk factors which would indicate the need for higher level of care including thoughts to harm self or others and/or self-harming behavior and encouraged patient to contact this office, call 911, or present to the nearest emergency room should any of these events occur. Discussed crisis intervention services and means to access.  Patient adamantly and convincingly denies current suicidal or homicidal ideation or perceptual disturbance.    Assessment     Diagnoses  and all orders for this visit:    1. Chronic post-traumatic stress disorder (PTSD) (Primary)    2. Moderate episode of recurrent major depressive disorder (HCC)    3. Insomnia due to mental condition    4. Generalized anxiety disorder               Mental Status Exam  Hygiene:  good  Dress:  casual  Attitude:  Cooperative  Motor Activity:  Appropriate  Speech:  Normal  Mood:  anxious, depressed and sad  Affect:  depressed and anxious  Thought Processes:  Goal directed  Thought Content:  normal  Suicidal Thoughts:  denies  Homicidal Thoughts:  denies  Crisis Safety Plan: yes, to come to the emergency room.  Hallucinations:  denies    Patient's Support Network Includes:  , daughter and extended family    Progress toward goal: Not at goal    Functional Status: Moderate impairment     Prognosis: Fair with Ongoing Treatment     Plan         Patient will adhere to medication regimen as prescribed and report any side effects. Patient will contact this office, call 911 or present to the nearest emergency room should suicidal or homicidal ideations occur. Provide Cognitive Behavioral Therapy and Integrative Therapy to improve functioning, maintain stability, and avoid decompensation and the need for higher level of care.          Return in about 2 weeks (around 10/11/2021).      This document signed by Gabriella Argueta LCSW,  October 6, 2021 09:18 EDT

## 2021-11-08 ENCOUNTER — OFFICE VISIT (OUTPATIENT)
Dept: PSYCHIATRY | Facility: CLINIC | Age: 46
End: 2021-11-08

## 2021-11-08 DIAGNOSIS — F41.1 GENERALIZED ANXIETY DISORDER: ICD-10-CM

## 2021-11-08 DIAGNOSIS — F51.05 INSOMNIA DUE TO MENTAL CONDITION: ICD-10-CM

## 2021-11-08 DIAGNOSIS — F33.1 MODERATE EPISODE OF RECURRENT MAJOR DEPRESSIVE DISORDER (HCC): ICD-10-CM

## 2021-11-08 DIAGNOSIS — G89.29 OTHER CHRONIC PAIN: ICD-10-CM

## 2021-11-08 DIAGNOSIS — F43.12 CHRONIC POST-TRAUMATIC STRESS DISORDER (PTSD): Primary | ICD-10-CM

## 2021-11-08 PROCEDURE — 90837 PSYTX W PT 60 MINUTES: CPT | Performed by: SOCIAL WORKER

## 2021-11-30 NOTE — PROGRESS NOTES
Date of Service:11/8/2021  Time In: 1230  Time Out: 126      PROGRESS NOTE  Data:  Priya Song is a 46 y.o. female who met with the undersigned for a regularly scheduled individual outpatient therapy session.  Patient started therapy by discussing her family dynamics and one big stressor related to her family since last session patient discussed compliance with medication management. Patient reports moderate depression and anxiety.     Clinical Maneuvering/Intervention:  Assisted patient in processing above session content; acknowledged and normalized patient’s thoughts, feelings, and concerns.  Assisted patient in processing her thoughts and feelings depression, anxiety, chronic pain and chronic health conditions.  Validated patient's thoughts and feelings of symptoms of depression and anxiety.   Patient denies SI, HI self-harm.  Patient is agreeable to safety plan.  Patient is agreeable to taking medications as prescribed and calling the prescribe with questions and concerns.    Patient is aware of crisis visits as needed.  Educated patient on mindfulness, relaxation techniques and grounding techniques to help with anxiety.  will see back in 2 weeks and as needed. Validated patients thoughts and feelings related to her family stress and dynamics. Reviewed ways to use conflict resolution with family members.     Allowed patient to freely discuss issues without interruption or judgment. Provided safe, confidential environment to facilitate the development of positive therapeutic relationship and encourage open, honest communication. Assisted patient in identifying risk factors which would indicate the need for higher level of care including thoughts to harm self or others and/or self-harming behavior and encouraged patient to contact this office, call 911, or present to the nearest emergency room should any of these events occur. Discussed crisis intervention services and means to access.  Patient adamantly and  convincingly denies current suicidal or homicidal ideation or perceptual disturbance.    Assessment     Diagnoses and all orders for this visit:    1. Chronic post-traumatic stress disorder (PTSD) (Primary)    2. Moderate episode of recurrent major depressive disorder (HCC)    3. Insomnia due to mental condition    4. Generalized anxiety disorder    5. Other chronic pain               Mental Status Exam  Hygiene:  good  Dress:  casual  Attitude:  Cooperative  Motor Activity:  Appropriate  Speech:  Normal  Mood:  anxious, depressed and sad  Affect:  depressed and anxious  Thought Processes:  Goal directed  Thought Content:  normal  Suicidal Thoughts:  denies  Homicidal Thoughts:  denies  Crisis Safety Plan: yes, to come to the emergency room.  Hallucinations:  denies    Patient's Support Network Includes:  , daughter and extended family    Progress toward goal: Not at goal    Functional Status: Moderate impairment     Prognosis: Fair with Ongoing Treatment     Plan         Patient will adhere to medication regimen as prescribed and report any side effects. Patient will contact this office, call 911 or present to the nearest emergency room should suicidal or homicidal ideations occur. Provide Cognitive Behavioral Therapy and Integrative Therapy to improve functioning, maintain stability, and avoid decompensation and the need for higher level of care.          Return in about 2 weeks (around 11/22/2021).      This document signed by Gabriella Argueta LCSW,  November 29, 2021 22:08 EST

## 2022-01-25 ENCOUNTER — OFFICE VISIT (OUTPATIENT)
Dept: PSYCHIATRY | Facility: CLINIC | Age: 47
End: 2022-01-25

## 2022-01-25 DIAGNOSIS — F41.1 GENERALIZED ANXIETY DISORDER: ICD-10-CM

## 2022-01-25 DIAGNOSIS — F51.05 INSOMNIA DUE TO MENTAL CONDITION: ICD-10-CM

## 2022-01-25 DIAGNOSIS — F43.12 CHRONIC POST-TRAUMATIC STRESS DISORDER (PTSD): Primary | ICD-10-CM

## 2022-01-25 DIAGNOSIS — F33.1 MODERATE EPISODE OF RECURRENT MAJOR DEPRESSIVE DISORDER: ICD-10-CM

## 2022-01-25 PROCEDURE — 90837 PSYTX W PT 60 MINUTES: CPT | Performed by: SOCIAL WORKER

## 2022-02-02 NOTE — PROGRESS NOTES
Date of Service:2022  Time In: 113  Time Out: 214      PROGRESS NOTE  Data:  Priya Song is a 46 y.o. female who met with the undersigned for a regularly scheduled individual outpatient therapy session.  Patient started therapy by reporting she and her  had COVID and still recovering from lingering side effects, patient discussed her mother in law  in the nursing home at the beginning of the month. Patient discussed depressed mood, anxiety and a few panic attacks.     Clinical Maneuvering/Intervention:  Assisted patient in processing above session content; acknowledged and normalized patient’s thoughts, feelings, and concerns.  Assisted patient in processing her thoughts and feelings depression, anxiety, chronic pain and chronic health conditions.  Validated patient's thoughts and feelings of symptoms of depression and anxiety.   Patient denies SI, HI self-harm.  Patient is agreeable to safety plan.  Patient is agreeable to taking medications as prescribed and calling the prescribe with questions and concerns.    Patient is aware of crisis visits as needed.  Educated patient on mindfulness, relaxation techniques and grounding techniques to help with anxiety.  will see back in 2 weeks and as needed. Validated patients thoughts and feelings related to her family stress and dynamics. Reviewed ways to use conflict resolution with family members. Reviewed grief sx and ways to assist with processing grief stages.     Allowed patient to freely discuss issues without interruption or judgment. Provided safe, confidential environment to facilitate the development of positive therapeutic relationship and encourage open, honest communication. Assisted patient in identifying risk factors which would indicate the need for higher level of care including thoughts to harm self or others and/or self-harming behavior and encouraged patient to contact this office, call 911, or present to the nearest emergency room  should any of these events occur. Discussed crisis intervention services and means to access.  Patient adamantly and convincingly denies current suicidal or homicidal ideation or perceptual disturbance.    Assessment     Diagnoses and all orders for this visit:    1. Chronic post-traumatic stress disorder (PTSD) (Primary)    2. Moderate episode of recurrent major depressive disorder (HCC)    3. Insomnia due to mental condition    4. Generalized anxiety disorder               Mental Status Exam  Hygiene:  good  Dress:  casual  Attitude:  Cooperative  Motor Activity:  Appropriate  Speech:  Normal  Mood:  anxious, depressed and sad  Affect:  depressed and anxious  Thought Processes:  Goal directed  Thought Content:  normal  Suicidal Thoughts:  denies  Homicidal Thoughts:  denies  Crisis Safety Plan: yes, to come to the emergency room.  Hallucinations:  denies    Patient's Support Network Includes:  , daughter and extended family    Progress toward goal: Not at goal    Functional Status: Moderate impairment     Prognosis: Fair with Ongoing Treatment     Plan         Patient will adhere to medication regimen as prescribed and report any side effects. Patient will contact this office, call 911 or present to the nearest emergency room should suicidal or homicidal ideations occur. Provide Cognitive Behavioral Therapy and Integrative Therapy to improve functioning, maintain stability, and avoid decompensation and the need for higher level of care.          Return in about 2 weeks (around 2/8/2022).      This document signed by Gabriella Argueta LCSW,  February 2, 2022 08:32 EST

## 2022-02-07 ENCOUNTER — OFFICE VISIT (OUTPATIENT)
Dept: PSYCHIATRY | Facility: CLINIC | Age: 47
End: 2022-02-07

## 2022-02-07 DIAGNOSIS — F43.12 CHRONIC POST-TRAUMATIC STRESS DISORDER (PTSD): Primary | ICD-10-CM

## 2022-02-07 DIAGNOSIS — F33.1 MODERATE EPISODE OF RECURRENT MAJOR DEPRESSIVE DISORDER: ICD-10-CM

## 2022-02-07 DIAGNOSIS — G89.29 OTHER CHRONIC PAIN: ICD-10-CM

## 2022-02-07 DIAGNOSIS — F51.05 INSOMNIA DUE TO MENTAL CONDITION: ICD-10-CM

## 2022-02-07 PROCEDURE — 90834 PSYTX W PT 45 MINUTES: CPT | Performed by: SOCIAL WORKER

## 2022-03-01 NOTE — PROGRESS NOTES
Date of Service:2/7/2022  Time In: 1130  Time Out: 1218      PROGRESS NOTE  Data:  Priya Song is a 46 y.o. female who met with the undersigned for a regularly scheduled individual outpatient therapy session.  Patient started therapy by reporting she is overall unhappy and depressed. Patient discussed health and quality of life today. Patient discussed that since the many specialist didn't giver her a dx that she is struggling with chronic pain and feels that they missed something. Patient discussed her current treatments she is receiving and wanting to feel better. patient discussed her relationship with her spouse and daughter.     Clinical Maneuvering/Intervention:  Assisted patient in processing above session content; acknowledged and normalized patient’s thoughts, feelings, and concerns.  Assisted patient in processing her thoughts and feelings depression, anxiety, chronic pain and chronic health conditions.  Validated patient's thoughts and feelings of symptoms of depression and anxiety.   Patient denies SI, HI self-harm.  Patient is agreeable to safety plan.  Patient is agreeable to taking medications as prescribed and calling the prescribe with questions and concerns.    Patient is aware of crisis visits as needed.  Educated patient on mindfulness, relaxation techniques and grounding techniques to help with anxiety.  will see back in 2 weeks and as needed. Validated patients thoughts and feelings related to her family stress and dynamics. Reviewed ways to use conflict resolution with family members. Reviewed grief sx and ways to assist with processing grief stages. Educated patient on coping skills for pain and being proactive about it even making small changes in her day to help improve mood.    Allowed patient to freely discuss issues without interruption or judgment. Provided safe, confidential environment to facilitate the development of positive therapeutic relationship and encourage open, honest  communication. Assisted patient in identifying risk factors which would indicate the need for higher level of care including thoughts to harm self or others and/or self-harming behavior and encouraged patient to contact this office, call 911, or present to the nearest emergency room should any of these events occur. Discussed crisis intervention services and means to access.  Patient adamantly and convincingly denies current suicidal or homicidal ideation or perceptual disturbance.    Assessment     Diagnoses and all orders for this visit:    1. Chronic post-traumatic stress disorder (PTSD) (Primary)    2. Moderate episode of recurrent major depressive disorder (HCC)    3. Insomnia due to mental condition    4. Other chronic pain               Mental Status Exam  Hygiene:  good  Dress:  casual  Attitude:  Cooperative  Motor Activity:  Appropriate  Speech:  Normal  Mood:  anxious, depressed and sad  Affect:  depressed and anxious  Thought Processes:  Goal directed  Thought Content:  normal  Suicidal Thoughts:  denies  Homicidal Thoughts:  denies  Crisis Safety Plan: yes, to come to the emergency room.  Hallucinations:  denies    Patient's Support Network Includes:  , daughter and extended family    Progress toward goal: Not at goal    Functional Status: Moderate impairment     Prognosis: Fair with Ongoing Treatment     Plan         Patient will adhere to medication regimen as prescribed and report any side effects. Patient will contact this office, call 911 or present to the nearest emergency room should suicidal or homicidal ideations occur. Provide Cognitive Behavioral Therapy and Integrative Therapy to improve functioning, maintain stability, and avoid decompensation and the need for higher level of care.          Return in about 2 weeks (around 2/21/2022).      This document signed by Gabriella Argueta LCSW,  March 1, 2022 04:29 EST

## 2022-04-25 ENCOUNTER — OFFICE VISIT (OUTPATIENT)
Dept: PSYCHIATRY | Facility: CLINIC | Age: 47
End: 2022-04-25

## 2022-04-25 DIAGNOSIS — F51.05 INSOMNIA DUE TO MENTAL CONDITION: ICD-10-CM

## 2022-04-25 DIAGNOSIS — G89.29 OTHER CHRONIC PAIN: ICD-10-CM

## 2022-04-25 DIAGNOSIS — F33.1 MODERATE EPISODE OF RECURRENT MAJOR DEPRESSIVE DISORDER: ICD-10-CM

## 2022-04-25 DIAGNOSIS — F43.12 CHRONIC POST-TRAUMATIC STRESS DISORDER (PTSD): Primary | ICD-10-CM

## 2022-04-25 PROCEDURE — 90837 PSYTX W PT 60 MINUTES: CPT | Performed by: SOCIAL WORKER

## 2022-05-01 NOTE — PROGRESS NOTES
Date of Service:4/25/2022  Time In: 1130  Time Out: 1227      PROGRESS NOTE  Data:  Priya Song is a 46 y.o. female who met with the undersigned for a regularly scheduled individual outpatient therapy session.  Patient started therapy by reporting that she is having some sadness and anxiety since her daughter is graduating from college and moving in with her boyfriend. Patient reports increased anxiety.     Clinical Maneuvering/Intervention:  Assisted patient in processing above session content; acknowledged and normalized patient’s thoughts, feelings, and concerns.  Assisted patient in processing her thoughts and feelings depression, anxiety, chronic pain and chronic health conditions.  Validated patient's thoughts and feelings of symptoms of depression and anxiety.   Patient denies SI, HI self-harm.  Patient is agreeable to safety plan.  Patient is agreeable to taking medications as prescribed and calling the prescribe with questions and concerns.    Patient is aware of crisis visits as needed.  Educated patient on mindfulness, relaxation techniques and grounding techniques to help with anxiety.  will see back in 2 weeks and as needed. Validated patients thoughts and feelings related to her family stress and dynamics. Reviewed ways to use conflict resolution with family members. Reviewed grief sx and ways to assist with processing grief stages. Educated patient on coping skills for pain and being proactive about it even making small changes in her day to help improve mood. Educated om acceptance therapy to help with transitions in life.     Allowed patient to freely discuss issues without interruption or judgment. Provided safe, confidential environment to facilitate the development of positive therapeutic relationship and encourage open, honest communication. Assisted patient in identifying risk factors which would indicate the need for higher level of care including thoughts to harm self or others and/or  self-harming behavior and encouraged patient to contact this office, call 911, or present to the nearest emergency room should any of these events occur. Discussed crisis intervention services and means to access.  Patient adamantly and convincingly denies current suicidal or homicidal ideation or perceptual disturbance.    Assessment     Diagnoses and all orders for this visit:    1. Chronic post-traumatic stress disorder (PTSD) (Primary)    2. Moderate episode of recurrent major depressive disorder (HCC)    3. Insomnia due to mental condition    4. Other chronic pain               Mental Status Exam  Hygiene:  good  Dress:  casual  Attitude:  Cooperative  Motor Activity:  Appropriate  Speech:  Normal  Mood:  anxious, depressed and sad  Affect:  depressed and anxious  Thought Processes:  Goal directed  Thought Content:  normal  Suicidal Thoughts:  denies  Homicidal Thoughts:  denies  Crisis Safety Plan: yes, to come to the emergency room.  Hallucinations:  denies    Patient's Support Network Includes:  , daughter and extended family    Progress toward goal: Not at goal    Functional Status: Moderate impairment     Prognosis: Fair with Ongoing Treatment     Plan         Patient will adhere to medication regimen as prescribed and report any side effects. Patient will contact this office, call 911 or present to the nearest emergency room should suicidal or homicidal ideations occur. Provide Cognitive Behavioral Therapy and Integrative Therapy to improve functioning, maintain stability, and avoid decompensation and the need for higher level of care.          Return in about 2 weeks (around 5/9/2022).      This document signed by Gabriella Argueta LCSW,  May 1, 2022 18:09 EDT